# Patient Record
Sex: MALE | Race: WHITE | Employment: OTHER | URBAN - METROPOLITAN AREA
[De-identification: names, ages, dates, MRNs, and addresses within clinical notes are randomized per-mention and may not be internally consistent; named-entity substitution may affect disease eponyms.]

---

## 2017-01-25 ENCOUNTER — TELEPHONE (OUTPATIENT)
Dept: PULMONOLOGY | Age: 77
End: 2017-01-25

## 2017-04-10 ENCOUNTER — TELEPHONE (OUTPATIENT)
Dept: FAMILY MEDICINE CLINIC | Age: 77
End: 2017-04-10

## 2017-04-10 ENCOUNTER — HOSPITAL ENCOUNTER (OUTPATIENT)
Dept: ULTRASOUND IMAGING | Age: 77
Discharge: HOME | End: 2017-04-10
Attending: NURSE PRACTITIONER | Admitting: NURSE PRACTITIONER

## 2017-04-10 DIAGNOSIS — M79.606 PAIN OF LOWER EXTREMITY, UNSPECIFIED LATERALITY: ICD-10-CM

## 2017-04-10 DIAGNOSIS — M79.604 PAIN OF RIGHT LEG: ICD-10-CM

## 2017-04-10 DIAGNOSIS — M79.604 RIGHT LEG PAIN: ICD-10-CM

## 2017-04-10 RX ORDER — TRAMADOL HYDROCHLORIDE 50 MG/1
50 TABLET ORAL EVERY 6 HOURS PRN
Qty: 30 TABLET | Refills: 0 | Status: SHIPPED | OUTPATIENT
Start: 2017-04-10 | End: 2017-04-12

## 2017-04-12 ENCOUNTER — OFFICE VISIT (OUTPATIENT)
Dept: FAMILY MEDICINE CLINIC | Age: 77
End: 2017-04-12

## 2017-04-12 ENCOUNTER — TELEPHONE (OUTPATIENT)
Dept: FAMILY MEDICINE CLINIC | Age: 77
End: 2017-04-12

## 2017-04-12 VITALS
DIASTOLIC BLOOD PRESSURE: 86 MMHG | TEMPERATURE: 97.6 F | HEIGHT: 70 IN | SYSTOLIC BLOOD PRESSURE: 148 MMHG | WEIGHT: 181 LBS | BODY MASS INDEX: 25.91 KG/M2 | HEART RATE: 88 BPM | OXYGEN SATURATION: 97 %

## 2017-04-12 DIAGNOSIS — D75.1 POLYCYTHEMIA: ICD-10-CM

## 2017-04-12 DIAGNOSIS — N28.9 RENAL INSUFFICIENCY: ICD-10-CM

## 2017-04-12 DIAGNOSIS — I82.409 RECURRENT DEEP VEIN THROMBOSIS (DVT) (HCC): ICD-10-CM

## 2017-04-12 DIAGNOSIS — I82.401 ACUTE DEEP VEIN THROMBOSIS (DVT) OF RIGHT LOWER EXTREMITY, UNSPECIFIED VEIN (HCC): ICD-10-CM

## 2017-04-12 PROCEDURE — G8420 CALC BMI NORM PARAMETERS: HCPCS | Performed by: NURSE PRACTITIONER

## 2017-04-12 PROCEDURE — 1123F ACP DISCUSS/DSCN MKR DOCD: CPT | Performed by: NURSE PRACTITIONER

## 2017-04-12 PROCEDURE — 4040F PNEUMOC VAC/ADMIN/RCVD: CPT | Performed by: NURSE PRACTITIONER

## 2017-04-12 PROCEDURE — 99214 OFFICE O/P EST MOD 30 MIN: CPT | Performed by: NURSE PRACTITIONER

## 2017-04-12 PROCEDURE — 1036F TOBACCO NON-USER: CPT | Performed by: NURSE PRACTITIONER

## 2017-04-12 PROCEDURE — G8427 DOCREV CUR MEDS BY ELIG CLIN: HCPCS | Performed by: NURSE PRACTITIONER

## 2017-04-13 ASSESSMENT — ENCOUNTER SYMPTOMS
BLOOD IN STOOL: 0
ABDOMINAL PAIN: 0
COLOR CHANGE: 1
SHORTNESS OF BREATH: 1

## 2017-05-01 ENCOUNTER — OFFICE VISIT (OUTPATIENT)
Dept: FAMILY MEDICINE CLINIC | Age: 77
End: 2017-05-01

## 2017-05-01 VITALS
RESPIRATION RATE: 14 BRPM | HEIGHT: 70 IN | OXYGEN SATURATION: 96 % | BODY MASS INDEX: 26 KG/M2 | DIASTOLIC BLOOD PRESSURE: 100 MMHG | WEIGHT: 181.6 LBS | HEART RATE: 63 BPM | SYSTOLIC BLOOD PRESSURE: 154 MMHG

## 2017-05-01 DIAGNOSIS — I82.401 ACUTE DEEP VEIN THROMBOSIS (DVT) OF RIGHT LOWER EXTREMITY, UNSPECIFIED VEIN (HCC): ICD-10-CM

## 2017-05-01 DIAGNOSIS — D45 POLYCYTHEMIA RUBRA VERA (HCC): ICD-10-CM

## 2017-05-01 DIAGNOSIS — M79.604 PAIN IN RIGHT LEG: Primary | ICD-10-CM

## 2017-05-01 DIAGNOSIS — I10 ESSENTIAL HYPERTENSION: ICD-10-CM

## 2017-05-01 DIAGNOSIS — J44.9 CHRONIC OBSTRUCTIVE PULMONARY DISEASE, UNSPECIFIED COPD TYPE (HCC): ICD-10-CM

## 2017-05-01 DIAGNOSIS — I50.9 CONGESTIVE HEART FAILURE, UNSPECIFIED: ICD-10-CM

## 2017-05-01 PROCEDURE — G8420 CALC BMI NORM PARAMETERS: HCPCS | Performed by: NURSE PRACTITIONER

## 2017-05-01 PROCEDURE — 1036F TOBACCO NON-USER: CPT | Performed by: NURSE PRACTITIONER

## 2017-05-01 PROCEDURE — 1123F ACP DISCUSS/DSCN MKR DOCD: CPT | Performed by: NURSE PRACTITIONER

## 2017-05-01 PROCEDURE — G8926 SPIRO NO PERF OR DOC: HCPCS | Performed by: NURSE PRACTITIONER

## 2017-05-01 PROCEDURE — 4040F PNEUMOC VAC/ADMIN/RCVD: CPT | Performed by: NURSE PRACTITIONER

## 2017-05-01 PROCEDURE — G8427 DOCREV CUR MEDS BY ELIG CLIN: HCPCS | Performed by: NURSE PRACTITIONER

## 2017-05-01 PROCEDURE — 99213 OFFICE O/P EST LOW 20 MIN: CPT | Performed by: NURSE PRACTITIONER

## 2017-05-01 PROCEDURE — 3023F SPIROM DOC REV: CPT | Performed by: NURSE PRACTITIONER

## 2017-05-01 RX ORDER — TRAMADOL HYDROCHLORIDE 50 MG/1
50 TABLET ORAL EVERY 6 HOURS PRN
Qty: 30 TABLET | Refills: 0 | Status: SHIPPED | OUTPATIENT
Start: 2017-05-01 | End: 2017-05-08

## 2017-05-07 ASSESSMENT — ENCOUNTER SYMPTOMS
SHORTNESS OF BREATH: 0
CHEST TIGHTNESS: 0
COUGH: 0
COLOR CHANGE: 0
BACK PAIN: 0

## 2017-05-18 PROBLEM — D45 POLYCYTHEMIA VERA (HCC): Status: ACTIVE | Noted: 2017-05-18

## 2017-06-18 PROBLEM — K62.5 BRIGHT RED RECTAL BLEEDING: Status: ACTIVE | Noted: 2017-06-18

## 2017-06-21 ENCOUNTER — CARE COORDINATION (OUTPATIENT)
Dept: CASE MANAGEMENT | Age: 77
End: 2017-06-21

## 2017-06-21 ENCOUNTER — TELEPHONE (OUTPATIENT)
Dept: PHARMACY | Facility: CLINIC | Age: 77
End: 2017-06-21

## 2017-06-22 RX ORDER — CARVEDILOL 12.5 MG/1
12.5 TABLET ORAL 2 TIMES DAILY WITH MEALS
COMMUNITY
End: 2020-09-14 | Stop reason: SDUPTHER

## 2017-06-27 ENCOUNTER — CARE COORDINATION (OUTPATIENT)
Dept: CASE MANAGEMENT | Age: 77
End: 2017-06-27

## 2017-06-29 ENCOUNTER — TELEPHONE (OUTPATIENT)
Dept: FAMILY MEDICINE CLINIC | Age: 77
End: 2017-06-29

## 2017-08-10 ENCOUNTER — TELEPHONE (OUTPATIENT)
Dept: FAMILY MEDICINE CLINIC | Age: 77
End: 2017-08-10

## 2017-09-14 DIAGNOSIS — I10 ESSENTIAL HYPERTENSION: ICD-10-CM

## 2017-09-14 DIAGNOSIS — R60.1 GENERALIZED EDEMA: ICD-10-CM

## 2017-09-15 DIAGNOSIS — I50.22 CHRONIC SYSTOLIC CHF (CONGESTIVE HEART FAILURE) (HCC): ICD-10-CM

## 2017-09-15 DIAGNOSIS — R60.1 GENERALIZED EDEMA: Primary | ICD-10-CM

## 2017-09-15 RX ORDER — FUROSEMIDE 40 MG/1
40 TABLET ORAL DAILY
Qty: 30 TABLET | Refills: 2 | Status: SHIPPED | OUTPATIENT
Start: 2017-09-15 | End: 2020-09-14 | Stop reason: SDUPTHER

## 2017-09-15 RX ORDER — FUROSEMIDE 20 MG/1
TABLET ORAL
Qty: 30 TABLET | Refills: 5 | OUTPATIENT
Start: 2017-09-15

## 2018-05-21 PROBLEM — D45 POLYCYTHEMIA VERA (HCC): Status: RESOLVED | Noted: 2017-05-18 | Resolved: 2018-05-21

## 2018-05-21 PROBLEM — N18.30 CKD (CHRONIC KIDNEY DISEASE) STAGE 3, GFR 30-59 ML/MIN (HCC): Chronic | Status: ACTIVE | Noted: 2018-05-21

## 2018-05-21 PROBLEM — R65.10 SIRS (SYSTEMIC INFLAMMATORY RESPONSE SYNDROME) (HCC): Status: ACTIVE | Noted: 2018-05-21

## 2018-05-21 PROBLEM — K57.90 DIVERTICULOSIS: Chronic | Status: ACTIVE | Noted: 2018-05-21

## 2018-05-21 PROBLEM — Z87.891 FORMER SMOKER: Chronic | Status: ACTIVE | Noted: 2018-05-21

## 2018-05-21 PROBLEM — M79.606 LEG PAIN: Status: RESOLVED | Noted: 2017-04-10 | Resolved: 2018-05-21

## 2018-05-21 PROBLEM — I16.0 HYPERTENSIVE URGENCY: Status: ACTIVE | Noted: 2018-05-21

## 2018-05-21 PROBLEM — K92.1 HEMATOCHEZIA: Status: ACTIVE | Noted: 2017-06-18

## 2018-10-03 ENCOUNTER — TELEPHONE (OUTPATIENT)
Dept: PULMONOLOGY | Age: 78
End: 2018-10-03

## 2018-10-03 DIAGNOSIS — R06.02 SOB (SHORTNESS OF BREATH): Primary | ICD-10-CM

## 2018-11-06 ENCOUNTER — OFFICE VISIT (OUTPATIENT)
Dept: PULMONOLOGY | Age: 78
End: 2018-11-06
Payer: MEDICARE

## 2018-11-06 VITALS
SYSTOLIC BLOOD PRESSURE: 148 MMHG | RESPIRATION RATE: 22 BRPM | DIASTOLIC BLOOD PRESSURE: 72 MMHG | HEART RATE: 73 BPM | BODY MASS INDEX: 27.11 KG/M2 | OXYGEN SATURATION: 95 % | WEIGHT: 183 LBS | HEIGHT: 69 IN | TEMPERATURE: 98.3 F

## 2018-11-06 DIAGNOSIS — R06.02 SOB (SHORTNESS OF BREATH): ICD-10-CM

## 2018-11-06 DIAGNOSIS — Z98.890 S/P MITRAL VALVE REPAIR: ICD-10-CM

## 2018-11-06 DIAGNOSIS — J44.9 CHRONIC OBSTRUCTIVE PULMONARY DISEASE, UNSPECIFIED COPD TYPE (HCC): Primary | Chronic | ICD-10-CM

## 2018-11-06 PROCEDURE — 99214 OFFICE O/P EST MOD 30 MIN: CPT | Performed by: INTERNAL MEDICINE

## 2018-11-06 PROCEDURE — 1101F PT FALLS ASSESS-DOCD LE1/YR: CPT | Performed by: INTERNAL MEDICINE

## 2018-11-06 PROCEDURE — 4040F PNEUMOC VAC/ADMIN/RCVD: CPT | Performed by: INTERNAL MEDICINE

## 2018-11-06 PROCEDURE — 1123F ACP DISCUSS/DSCN MKR DOCD: CPT | Performed by: INTERNAL MEDICINE

## 2018-11-06 PROCEDURE — G8427 DOCREV CUR MEDS BY ELIG CLIN: HCPCS | Performed by: INTERNAL MEDICINE

## 2018-11-06 PROCEDURE — 1036F TOBACCO NON-USER: CPT | Performed by: INTERNAL MEDICINE

## 2018-11-06 PROCEDURE — 3023F SPIROM DOC REV: CPT | Performed by: INTERNAL MEDICINE

## 2018-11-06 PROCEDURE — G8484 FLU IMMUNIZE NO ADMIN: HCPCS | Performed by: INTERNAL MEDICINE

## 2018-11-06 PROCEDURE — G8926 SPIRO NO PERF OR DOC: HCPCS | Performed by: INTERNAL MEDICINE

## 2018-11-06 PROCEDURE — G8419 CALC BMI OUT NRM PARAM NOF/U: HCPCS | Performed by: INTERNAL MEDICINE

## 2018-11-06 RX ORDER — ALBUTEROL SULFATE 90 UG/1
2 AEROSOL, METERED RESPIRATORY (INHALATION) EVERY 6 HOURS PRN
Qty: 1 INHALER | Refills: 5 | Status: SHIPPED | OUTPATIENT
Start: 2018-11-06

## 2018-11-06 RX ORDER — LEVALBUTEROL INHALATION SOLUTION 0.63 MG/3ML
0.63 SOLUTION RESPIRATORY (INHALATION) EVERY 6 HOURS PRN
Qty: 125 ML | Refills: 5 | Status: SHIPPED | OUTPATIENT
Start: 2018-11-06

## 2018-11-06 NOTE — PROGRESS NOTES
Reviewed any pertinent new labs that are available. PFTs 6/26/16  FVC  (51%) FEV1 1.15 (38%) FEV1/FVC ratio 0.53  TLC  (44%)  RV  (%)   DLCO (62%) Bronchodilator response: ++  FVC  (%) FEV1  (%) FEV1/FVC ratio    TLC  (%)  RV  (%)   DLCO (%) Bronchodilator response    IMAGING:  I personally reviewed and interpreted the following today in the office:   CXR:      Chest CT:    Assessment:   · SMART: multifactorial most likely including COPD and systolic CHF and possibly  · Severe Restrictive lung disease on prior PFTs - will need to r/o Diaphragm dysfunction from his prior open heart surgery if persists on repeat testing  · Chronic Systolic CHF  · MVR s/p annuloplasty  · PCV on Hydrea and phlebotomy in the past - no follow up recently for this  · Afib  - stopped Eliquis due to a GI bleed  · H/o RLE DVT     Plan:   · PFTs/6MWT  · Rx for proventil. Start using a spacer.    · Will schedule a Chest CT and SNIFF test if his restrictive lung disease is redemonstrated  · Add Incruse  · Start nebulizer with xoponex meds  · On lasix per Dr Manisha Ocampo  Will discuss Pulmonary rehab next visit

## 2018-11-28 ENCOUNTER — TELEPHONE (OUTPATIENT)
Dept: PULMONOLOGY | Age: 78
End: 2018-11-28

## 2020-09-10 ENCOUNTER — TELEPHONE (OUTPATIENT)
Dept: FAMILY MEDICINE CLINIC | Age: 80
End: 2020-09-10

## 2020-09-10 ENCOUNTER — NURSE TRIAGE (OUTPATIENT)
Dept: OTHER | Facility: CLINIC | Age: 80
End: 2020-09-10

## 2020-09-10 NOTE — TELEPHONE ENCOUNTER
Pt. Daughter to call and set up an appointment for patient to be seen for SOB, Whistling with breath intake, ? Extra fluid on heart/lung. States he has not been taking any of his medications, because he was saving them, and now is struggling with extra fluid and SOB. Since he had not been seen x 3 years I explained to daughter that he would have to establish as a NP and I could offer him an appt. For 9-. Pt. Is refusing to be seen at an ER or [address] [place] [address]. After daughter explaining to me his O2 sat was 80 I relayed patient message to [facility] for review.

## 2020-09-10 NOTE — TELEPHONE ENCOUNTER
Reason for Disposition   MODERATE difficulty breathing (e.g., speaks in phrases, SOB even at rest, pulse 100-120) of new onset or worse than normal    Answer Assessment - Initial Assessment Questions  1. RESPIRATORY STATUS: \"Describe your breathing? \" (e.g., wheezing, shortness of breath, unable to speak, severe coughing)       Shortness of breath and wheezing  2. ONSET: \"When did this breathing problem begin? \"       Has chronic shortness of breath due to COPD and CHF, caller states that his breathing has gotten worse, started yesterday  3. PATTERN \"Does the difficult breathing come and go, or has it been constant since it started? \"       constant  4. SEVERITY: \"How bad is your breathing? \" (e.g., mild, moderate, severe)     - MILD: No SOB at rest, mild SOB with walking, speaks normally in sentences, can lay down, no retractions, pulse < 100.     - MODERATE: SOB at rest, SOB with minimal exertion and prefers to sit, cannot lie down flat, speaks in phrases, mild retractions, audible wheezing, pulse 100-120.     - SEVERE: Very SOB at rest, speaks in single words, struggling to breathe, sitting hunched forward, retractions, pulse > 120       Moderate, pulse oximeter 88%  5. RECURRENT SYMPTOM: \"Have you had difficulty breathing before? \" If so, ask: \"When was the last time? \" and \"What happened that time? \"       yes  6. CARDIAC HISTORY: \"Do you have any history of heart disease? \" (e.g., heart attack, angina, bypass surgery, angioplasty)       CHF  7. LUNG HISTORY: \"Do you have any history of lung disease? \"  (e.g., pulmonary embolus, asthma, emphysema)      COPD  8. CAUSE: \"What do you think is causing the breathing problem? \"       Not taking medication on a regular basis  9. OTHER SYMPTOMS: \"Do you have any other symptoms? (e.g., dizziness, runny nose, cough, chest pain, fever)      Low back pain  10. PREGNANCY: \"Is there any chance you are pregnant? \" \"When was your last menstrual period? \"        n/a  11.  TRAVEL: \"Have you traveled out of the country in the last month? \" (e.g., travel history, exposures)        no    Protocols used: BREATHING DIFFICULTY-ADULT-[place]    POD 1 [place]  shortness of breath  Low back pain  Daughter called, patient is present, states that he is out of his medications and states that the patient has not been taking his medication daily. Patient refuses recommendation, only wants to be seen by PCP. Caller triage, care advice provided, caller verbalized understanding, transferred to [facility] for scheduling. Please do not reply to the triage nurse through this encounter. Any subsequent communication should be directly with the patient.

## 2020-09-14 ENCOUNTER — TELEPHONE (OUTPATIENT)
Dept: FAMILY MEDICINE CLINIC | Age: 80
End: 2020-09-14

## 2020-09-14 ENCOUNTER — OFFICE VISIT (OUTPATIENT)
Dept: FAMILY MEDICINE CLINIC | Age: 80
End: 2020-09-14
Payer: MEDICARE

## 2020-09-14 VITALS
BODY MASS INDEX: 23.56 KG/M2 | TEMPERATURE: 98.2 F | HEART RATE: 64 BPM | OXYGEN SATURATION: 97 % | DIASTOLIC BLOOD PRESSURE: 94 MMHG | SYSTOLIC BLOOD PRESSURE: 172 MMHG | WEIGHT: 164.6 LBS | RESPIRATION RATE: 20 BRPM | HEIGHT: 70 IN

## 2020-09-14 DIAGNOSIS — I50.22 CHRONIC SYSTOLIC CHF (CONGESTIVE HEART FAILURE) (HCC): Chronic | ICD-10-CM

## 2020-09-14 DIAGNOSIS — D45 POLYCYTHEMIA RUBRA VERA (HCC): Chronic | ICD-10-CM

## 2020-09-14 DIAGNOSIS — N18.30 CKD (CHRONIC KIDNEY DISEASE) STAGE 3, GFR 30-59 ML/MIN (HCC): Chronic | ICD-10-CM

## 2020-09-14 PROBLEM — R65.10 SIRS (SYSTEMIC INFLAMMATORY RESPONSE SYNDROME) (HCC): Status: RESOLVED | Noted: 2018-05-21 | Resolved: 2020-09-14

## 2020-09-14 LAB
A/G RATIO: 1.4 (ref 1.1–2.2)
ALBUMIN SERPL-MCNC: 3.9 G/DL (ref 3.4–5)
ALP BLD-CCNC: 123 U/L (ref 40–129)
ALT SERPL-CCNC: 7 U/L (ref 10–40)
ANION GAP SERPL CALCULATED.3IONS-SCNC: 21 MMOL/L (ref 3–16)
AST SERPL-CCNC: 8 U/L (ref 15–37)
BASOPHILS ABSOLUTE: 0.2 K/UL (ref 0–0.2)
BASOPHILS RELATIVE PERCENT: 1.6 %
BILIRUB SERPL-MCNC: 0.6 MG/DL (ref 0–1)
BUN BLDV-MCNC: 68 MG/DL (ref 7–20)
CALCIUM SERPL-MCNC: 9 MG/DL (ref 8.3–10.6)
CHLORIDE BLD-SCNC: 98 MMOL/L (ref 99–110)
CO2: 19 MMOL/L (ref 21–32)
CREAT SERPL-MCNC: 4.1 MG/DL (ref 0.8–1.3)
EOSINOPHILS ABSOLUTE: 0.1 K/UL (ref 0–0.6)
EOSINOPHILS RELATIVE PERCENT: 1.1 %
GFR AFRICAN AMERICAN: 17
GFR NON-AFRICAN AMERICAN: 14
GLOBULIN: 2.8 G/DL
GLUCOSE BLD-MCNC: 101 MG/DL (ref 70–99)
HCT VFR BLD CALC: 63.9 % (ref 40.5–52.5)
HEMOGLOBIN: 19.7 G/DL (ref 13.5–17.5)
LYMPHOCYTES ABSOLUTE: 0.7 K/UL (ref 1–5.1)
LYMPHOCYTES RELATIVE PERCENT: 5.6 %
MCH RBC QN AUTO: 26.3 PG (ref 26–34)
MCHC RBC AUTO-ENTMCNC: 30.9 G/DL (ref 31–36)
MCV RBC AUTO: 85.1 FL (ref 80–100)
MONOCYTES ABSOLUTE: 0.9 K/UL (ref 0–1.3)
MONOCYTES RELATIVE PERCENT: 7.2 %
NEUTROPHILS ABSOLUTE: 11.1 K/UL (ref 1.7–7.7)
NEUTROPHILS RELATIVE PERCENT: 84.5 %
PDW BLD-RTO: 19.5 % (ref 12.4–15.4)
PLATELET # BLD: 280 K/UL (ref 135–450)
PMV BLD AUTO: 8.4 FL (ref 5–10.5)
POTASSIUM SERPL-SCNC: 4 MMOL/L (ref 3.5–5.1)
RBC # BLD: 7.51 M/UL (ref 4.2–5.9)
SODIUM BLD-SCNC: 138 MMOL/L (ref 136–145)
TOTAL PROTEIN: 6.7 G/DL (ref 6.4–8.2)
WBC # BLD: 13.2 K/UL (ref 4–11)

## 2020-09-14 PROCEDURE — 99214 OFFICE O/P EST MOD 30 MIN: CPT | Performed by: STUDENT IN AN ORGANIZED HEALTH CARE EDUCATION/TRAINING PROGRAM

## 2020-09-14 RX ORDER — CARVEDILOL 12.5 MG/1
6.25 TABLET ORAL 2 TIMES DAILY WITH MEALS
Qty: 60 TABLET | Refills: 3 | Status: ON HOLD | OUTPATIENT
Start: 2020-09-14 | End: 2020-09-23 | Stop reason: HOSPADM

## 2020-09-14 RX ORDER — FUROSEMIDE 40 MG/1
40 TABLET ORAL DAILY
Qty: 30 TABLET | Refills: 2 | Status: ON HOLD | OUTPATIENT
Start: 2020-09-14 | End: 2020-09-23 | Stop reason: HOSPADM

## 2020-09-14 RX ORDER — LISINOPRIL 5 MG/1
5 TABLET ORAL DAILY
Qty: 30 TABLET | Refills: 2 | Status: ON HOLD | OUTPATIENT
Start: 2020-09-14 | End: 2020-09-23 | Stop reason: HOSPADM

## 2020-09-14 ASSESSMENT — ENCOUNTER SYMPTOMS
DIARRHEA: 0
WHEEZING: 0
CONSTIPATION: 0
CHEST TIGHTNESS: 1
SHORTNESS OF BREATH: 1

## 2020-09-14 ASSESSMENT — PATIENT HEALTH QUESTIONNAIRE - PHQ9
1. LITTLE INTEREST OR PLEASURE IN DOING THINGS: 1
SUM OF ALL RESPONSES TO PHQ QUESTIONS 1-9: 2
SUM OF ALL RESPONSES TO PHQ QUESTIONS 1-9: 2
SUM OF ALL RESPONSES TO PHQ9 QUESTIONS 1 & 2: 2
2. FEELING DOWN, DEPRESSED OR HOPELESS: 1

## 2020-09-14 NOTE — PROGRESS NOTES
2020    Precious Santillan (:  1940) is a [de-identified] y.o. male, here for evaluation of the following medical concerns:    HPI    COPD:  Patient daughter report that initial diagnosis was 2 to 3 years ago. Briefly followed with pulmonologist after PFTs, was prescribed umeclidinium inhaler and took for 1 month however stopped due to cost.  Reports that he had mild improvement while taking this medication. Reports that he is now using albuterol 2-3 times a day and has improvement when using. A-fib  Reports that he was initially diagnosed with this 5 or 6 years ago. Followed with a cardiologist briefly however has not followed up in years. Daughter believes that a ablation was attempted and successful for short period of time during which patient felt better however return to chronic A. fib afterwards. He was also placed on Eliquis but this was stopped due to GI bleed. Patient does not wish to restart medication at this time. Reports she is drinking 1 cup of coffee a day otherwise avoiding caffeine    HF  Initially diagnosed in . Had mitral valve repair done at this time by Dr. Jaret Morris.  Has not followed up with him in 1-1/2 to 2 years. Reports that due to this he is run low on refills of carvedilol and Lasix has been taking half of his prescribed dose for the last 6 months. During this time he is noticed that he has had increased shortness of breath worsening dyspnea on exertion and chest pressure when attempting to lay flat. Did increase his dose of Lasix over the last few days and noticed an improvement. Weight loss  Reports a 20 pound weight loss in the last 3-6 months. Daughter and patient attribute this to decreased appetite. Patient reports that he eats very little and mostly eats either cereal or oranges. Denies protein intake. Hypertension  Reports history of hypertension that is been worse recently with decreased carvedilol and Lasix dose.  Daughter does report that she checked her blood pressure after taking dose of Lasix and that it improved to 140/65. Denies headache, chest pain, visual changes. Health Maintenance  Lives with wife, children come to check on them frequently  Does not wish to have invasive testing done    Review of Systems   Constitutional: Positive for appetite change and unexpected weight change. Eyes: Negative for visual disturbance. Respiratory: Positive for chest tightness and shortness of breath. Negative for wheezing. Cardiovascular: Positive for palpitations (chronic). Negative for chest pain. Gastrointestinal: Negative for constipation and diarrhea. Genitourinary: Positive for difficulty urinating (intermittently using flomax, has not used in some time). Skin: Negative for rash. Neurological: Negative for dizziness, light-headedness and headaches. Psychiatric/Behavioral: Negative for agitation. Prior to Visit Medications    Medication Sig Taking?  Authorizing Provider   lisinopril (PRINIVIL;ZESTRIL) 5 MG tablet Take 1 tablet by mouth daily Yes Boris Altman DO   furosemide (LASIX) 40 MG tablet Take 1 tablet by mouth daily Yes Boris Altman DO   carvedilol (COREG) 12.5 MG tablet Take 0.5 tablets by mouth 2 times daily (with meals) Yes Boris Altman DO   Umeclidinium Bromide 62.5 MCG/INH AEPB Inhale 62.5 mcg into the lungs daily Yes Boris Altman DO   fluticasone-salmeterol (ADVAIR DISKUS) 250-50 MCG/DOSE AEPB Inhale 1 puff into the lungs 2 times daily Yes Boris Altman DO   aspirin 81 MG tablet Take 81 mg by mouth daily Yes Historical Provider, MD   albuterol sulfate HFA (PROVENTIL HFA) 108 (90 Base) MCG/ACT inhaler Inhale 2 puffs into the lungs every 6 hours as needed for Wheezing Yes Rashid Santos MD   levalbuterol Pageedison Cavanaugh) 0.63 MG/3ML nebulization Take 3 mLs by nebulization every 6 hours as needed for Wheezing  Patient not taking: Reported on 9/14/2020  Rashid Santos MD   Spacer/Aero-Holding [place] (ESANG SPACER) MARIFER 1 Device by Does not apply route daily as needed (prn with Albuterol)  Patient not taking: Reported on 2020  Quita Vila MD   isosorbide mononitrate (IMDUR) 30 MG CR tablet Take 30 mg by mouth daily  Historical Provider, MD        Allergies   Allergen Reactions    Pcn [Penicillins] Rash     Skin peeling Charmel Locus Librado Clement reaction       Past Medical History:   Diagnosis Date    [facility] [facility]     CHF (congestive heart failure) (HCC)     COPD (chronic obstructive pulmonary disease) (HCC)     Elevated PSA     Enlarged prostate     Hypertension     Hyperthyroidism     resolved    Right leg DVT ([facility] [address] 2015       Past Surgical History:   Procedure Laterality Date    CARDIAC SURGERY      mitral valve repair    COLONOSCOPY  2017    COLONOSCOPY  2018    polyp removal, sigmoid diverticulosis, rectal bleeding    CYSTOSCOPY  1/20/15    Clot Evacuation    CYSTOSCOPY  2/18/15    HERNIA REPAIR      TURP  2/18/15    CLOT EVACUATION, TRANSURETHRAL RESECTION OF THE PROSTATE       Social History     Socioeconomic History    Marital status:      Spouse name: Not on file    Number of children: Not on file    Years of education: Not on file    Highest education level: Not on file   Occupational History    Not on file   Social Needs    Financial resource strain: Not on file    Food insecurity     Worry: Not on file     Inability: Not on file    Transportation needs     Medical: Not on file     Non-medical: Not on file   Tobacco Use    Smoking status: Former Smoker     Years: 30.00     Last attempt to quit: 2002     Years since quittin.7    Smokeless tobacco: Never Used    Tobacco comment: 2 pipes per day   Substance and Sexual Activity    Alcohol use: No    Drug use: No    Sexual activity: Yes     Partners: Female   Lifestyle    Physical activity     Days per week: Not on file     Minutes per session: Not on file    Stress: Not on file Relationships    Social connections     Talks on phone: Not on file     Gets together: Not on file     Attends [employer] service: Not on file     Active member of club or organization: Not on file     Attends meetings of clubs or organizations: Not on file     Relationship status: Not on file    Intimate partner violence     Fear of current or ex partner: Not on file     Emotionally abused: Not on file     Physically abused: Not on file     Forced sexual activity: Not on file   Other Topics Concern    Not on file   Social History Narrative    Not on file        No family history on file. Vitals:    09/14/20 0857   BP: (!) 172/94   Site: Left Upper Arm   Position: Sitting   Cuff Size: Medium Adult   Pulse: 64   Resp: 20   Temp: 98.2 °F (36.8 °C)   TempSrc: Temporal   SpO2: 97%   Weight: 164 lb 9.6 oz (74.7 kg)   Height: 5' 10\" (1.778 m)     Estimated body mass index is 23.62 kg/m² as calculated from the following:    Height as of this encounter: 5' 10\" (1.778 m). Weight as of this encounter: 164 lb 9.6 oz (74.7 kg). Physical Exam  Vitals signs reviewed. Constitutional:       General: He is not in acute distress. Appearance: Normal appearance. He is not ill-appearing. HENT:      Head: Normocephalic and atraumatic. Right Ear: Tympanic membrane normal.      Left Ear: Tympanic membrane normal.   Eyes:      Extraocular Movements: Extraocular movements intact. Conjunctiva/sclera: Conjunctivae normal.   Cardiovascular:      Rate and Rhythm: Normal rate. Rhythm irregular. Pulses: Normal pulses. Heart sounds: Murmur (2/6 mitral) present. Comments: NO JVD or peripheral edema on exam  Pulmonary:      Effort: Pulmonary effort is normal.      Breath sounds: Normal breath sounds. No wheezing or rhonchi. Abdominal:      General: Abdomen is flat. Bowel sounds are normal. There is no distension. Palpations: Abdomen is soft. Tenderness: There is no abdominal tenderness. Musculoskeletal: Normal range of motion. General: No swelling. Right lower leg: No edema. Left lower leg: No edema. Skin:     General: Skin is warm and dry. Capillary Refill: Capillary refill takes less than 2 seconds. Findings: No rash. Neurological:      General: No focal deficit present. Mental Status: He is alert and oriented to person, place, and time. Psychiatric:         Mood and Affect: Mood normal.         Behavior: Behavior normal.         Thought Content: Thought content normal.         Judgment: Judgment normal.         ASSESSMENT/PLAN:  1. Chronic obstructive pulmonary disease, unspecified COPD type ([facility] [address] [facility]  Patient is previously been on Umeclidinium, however stopped due to expense. Has not followed up with pulmonologist in over 2 years. Reports that he is using his albuterol inhaler 2-3 times a day. Reports that this is helping. Due to improvement with rescue inhaler, feel that some portion of shortness of breath probably is related to COPD. Please see addendum  - Umeclidinium Bromide 62.5 MCG/INH AEPB; Inhale 62.5 mcg into the lungs daily  Dispense: 30 each; Refill: 1  - fluticasone-salmeterol (ADVAIR DISKUS) 250-50 MCG/DOSE AEPB; Inhale 1 puff into the lungs 2 times daily  Dispense: 1 each; Refill: 5    2. Chronic systolic CHF (EF 97-17%)  Patient does not wish to have any further evaluation of heart failure at this time. Does not know dry weight. No signs of fluid overload on exam today besides dyspnea. Will restart Lasix and Coreg (at half dose). Will follow-up in 2 weeks to see how current exacerbation is doing.  - Comprehensive Metabolic Panel; Future  - furosemide (LASIX) 40 MG tablet; Take 1 tablet by mouth daily  Dispense: 30 tablet; Refill: 2  - carvedilol (COREG) 12.5 MG tablet; Take 0.5 tablets by mouth 2 times daily (with meals)  Dispense: 60 tablet; Refill: 3    3.  CKD (chronic kidney disease) stage 3, GFR 30-59 ml/min ([facility])  Patient with history of CKD will recheck  - Comprehensive Metabolic Panel; Future    4. Essential hypertension  BP elevated on exam today. Will add lisinopril given heart failure. Daughter reports blood pressure much more well controlled at home, will start low-dose and increase as necessary.  - lisinopril (PRINIVIL;ZESTRIL) 5 MG tablet; Take 1 tablet by mouth daily  Dispense: 30 tablet; Refill: 2    5. Polycythemia rubra vera ([facility] [address]  Recheck CBC.  - CBC Auto Differential; Future    6. Generalized edema  Likely secondary to heart failure  - furosemide (LASIX) 40 MG tablet; Take 1 tablet by mouth daily  Dispense: 30 tablet; Refill: 2    7. Chronic systolic CHF (congestive heart failure) ([facility] [address]  As above. Refuses echo  - Comprehensive Metabolic Panel; Future  - furosemide (LASIX) 40 MG tablet; Take 1 tablet by mouth daily  Dispense: 30 tablet; Refill: 2  - carvedilol (COREG) 12.5 MG tablet; Take 0.5 tablets by mouth 2 times daily (with meals)  Dispense: 60 tablet; Refill: 3    8. Longstanding persistent atrial fibrillation  Currently rate controlled on carvedilol. Prior ablation with improvement for approximately 6 months until recurrent A. Fib. Does not wish for cardiac evaluation at this time. Declines blood thinner    Return in about 2 weeks (around 9/28/2020). An  electronic signature was used to authenticate this note. --Shilpi Rosen, DO on 9/14/2020 at 4:38 PM       Addendum:  Called and discussed options with daughter in law regarding inhalers. Discussed that umeclidinium would be optimal first choice however understand that cost is a factor. If this is too expensive, also sent in prescription for Advair.   Discussed that Advair is more of an asthma medication but can be useful in COPD given its an expensive cost.

## 2020-09-14 NOTE — TELEPHONE ENCOUNTER
Called and discussed inhaler medications with daughter-in-law. Will send and Umeclidinium and Advair. Discussed that Advair was a backup option if cost was prohibitive for umeclidinium. Daughter-in-law expressed understanding.

## 2020-09-15 RX ORDER — FLUTICASONE FUROATE, UMECLIDINIUM BROMIDE AND VILANTEROL TRIFENATATE 100; 62.5; 25 UG/1; UG/1; UG/1
1 POWDER RESPIRATORY (INHALATION) DAILY
Qty: 1 EACH | Refills: 0 | Status: SHIPPED | OUTPATIENT
Start: 2020-09-15

## 2020-09-15 NOTE — TELEPHONE ENCOUNTER
Called patient to discuss recent lab work that showed significant decrease in kidney function as compared to prior labs 2 years ago. Also showed significantly elevated Hgb/HCt consistent with previous polycythemia diagnosis. Patient asked to have his daughter call back and discuss this further with me this afternoon. Will talk to daughter to determine further treatment plan. Also, saw note about trelegy inhaler.  Will send in script for trial.

## 2020-09-15 NOTE — TELEPHONE ENCOUNTER
Bradford Ny, pt's daughter called to let Dr. Meliton Hernandes know the Incruse (Umeclidinium) inhaler that was prescribed 9/14/20  will cost pt around $300 dollars. The pharmacy advised if PCP would be willing to switch medication to Trelegy Ellipta they have a free #30 day coupon offer pt can use. Bradford Ny stated If pt tolerates/ and medication works then they would asked that # 90 supply be sent to mail order. (not to be sent until pt tries other inhaler) are you willing to switch medication?    I did pend medication for you Send to [facility]

## 2020-09-16 ENCOUNTER — TELEPHONE (OUTPATIENT)
Dept: FAMILY MEDICINE CLINIC | Age: 80
End: 2020-09-16

## 2020-09-16 NOTE — TELEPHONE ENCOUNTER
Daughter Janna Gaines called back regarding recent lab work. Discussed with daughter results consistent with polycythemia and recommended further evaluation at [facility] where patient has been seen before. Daughter stated that she would talk with her father and see if he would be amenable to seeing them again. Will call back. Also discussed worsening kidney function with current creatinine 4.1. Reports that patient has been compliant with Lasix dose at home and is feeling better, continuing to make urine. Is agreeable to have additional lab tests drawn to check status of kidney function while taking Lasix. We will have this done on Friday. Discussed risks of current kidney function with Lasix dosing.

## 2020-09-19 ENCOUNTER — TELEPHONE (OUTPATIENT)
Dept: FAMILY MEDICINE CLINIC | Age: 80
End: 2020-09-19

## 2020-09-19 RX ORDER — VALACYCLOVIR HYDROCHLORIDE 1 G/1
1000 TABLET, FILM COATED ORAL 3 TIMES DAILY
Qty: 21 TABLET | Refills: 0 | Status: ON HOLD | OUTPATIENT
Start: 2020-09-19 | End: 2020-09-23 | Stop reason: HOSPADM

## 2020-09-20 NOTE — TELEPHONE ENCOUNTER
On call communication:    Daughter calling in stating patient developed shingles today. Posterior neck on the right side at his hairline with vesicles, some vesicles behind his right ear. No vesicles on his face or near his eye. Had not been feeling well for a few days prior, had a headache and achiness, fatigue. Daughter has worked in primary care and dermatology, has seen shingles before, and states she believes his rash is shingles. Discussed early treatment of shingles with antivirals is best for preventing long term pain. Sent in Valtrex to his pharmacy to be filled tonight if possible, or at least first thing in the morning if they are currently closed. Already has close follow up with Dr. Dinesh Villalta in about 2 weeks, but they are happy to bring him in sooner if needed. I advised to call us back for follow up if he has severe pain, or if any of the vesicles look like they are getting infected. Daughter was agreeable to that plan.

## 2020-09-21 ENCOUNTER — HOSPITAL ENCOUNTER (INPATIENT)
Age: 80
LOS: 2 days | Discharge: HOME | DRG: 683 | End: 2020-09-23
Attending: STUDENT IN AN ORGANIZED HEALTH CARE EDUCATION/TRAINING PROGRAM | Admitting: INTERNAL MEDICINE
Payer: MEDICARE

## 2020-09-21 ENCOUNTER — APPOINTMENT (OUTPATIENT)
Dept: CT IMAGING | Age: 80
DRG: 683 | End: 2020-09-21
Payer: MEDICARE

## 2020-09-21 ENCOUNTER — APPOINTMENT (OUTPATIENT)
Dept: GENERAL RADIOLOGY | Age: 80
DRG: 683 | End: 2020-09-21
Payer: MEDICARE

## 2020-09-21 ENCOUNTER — NURSE TRIAGE (OUTPATIENT)
Dept: OTHER | Facility: CLINIC | Age: 80
End: 2020-09-21

## 2020-09-21 PROBLEM — N17.9 AKI (ACUTE KIDNEY INJURY) (HCC): Status: ACTIVE | Noted: 2020-09-21

## 2020-09-21 LAB
A/G RATIO: 1.1 (ref 1.1–2.2)
ALBUMIN SERPL-MCNC: 3.9 G/DL (ref 3.4–5)
ALP BLD-CCNC: 114 U/L (ref 40–129)
ALT SERPL-CCNC: 10 U/L (ref 10–40)
ANION GAP SERPL CALCULATED.3IONS-SCNC: 15 MMOL/L (ref 3–16)
ANISOCYTOSIS: ABNORMAL
APTT: 48.6 SEC (ref 24.2–36.2)
AST SERPL-CCNC: 14 U/L (ref 15–37)
BACTERIA: ABNORMAL /HPF
BASOPHILS ABSOLUTE: 0 K/UL (ref 0–0.2)
BASOPHILS RELATIVE PERCENT: 0 %
BILIRUB SERPL-MCNC: 0.8 MG/DL (ref 0–1)
BILIRUBIN URINE: NEGATIVE
BLOOD, URINE: ABNORMAL
BUN BLDV-MCNC: 62 MG/DL (ref 7–20)
CALCIUM SERPL-MCNC: 9.2 MG/DL (ref 8.3–10.6)
CHLORIDE BLD-SCNC: 96 MMOL/L (ref 99–110)
CLARITY: ABNORMAL
CO2: 19 MMOL/L (ref 21–32)
COLOR: YELLOW
CREAT SERPL-MCNC: 3.7 MG/DL (ref 0.8–1.3)
EKG ATRIAL RATE: 125 BPM
EKG DIAGNOSIS: NORMAL
EKG Q-T INTERVAL: 392 MS
EKG QRS DURATION: 152 MS
EKG QTC CALCULATION (BAZETT): 508 MS
EKG R AXIS: -69 DEGREES
EKG T AXIS: 49 DEGREES
EKG VENTRICULAR RATE: 101 BPM
EOSINOPHILS ABSOLUTE: 0.2 K/UL (ref 0–0.6)
EOSINOPHILS RELATIVE PERCENT: 1 %
GFR AFRICAN AMERICAN: 19
GFR NON-AFRICAN AMERICAN: 16
GLOBULIN: 3.7 G/DL
GLUCOSE BLD-MCNC: 177 MG/DL (ref 70–99)
GLUCOSE URINE: NEGATIVE MG/DL
HCT VFR BLD CALC: 61.3 % (ref 40.5–52.5)
HEMOGLOBIN: 19.6 G/DL (ref 13.5–17.5)
INR BLD: 1.14 (ref 0.86–1.14)
KETONES, URINE: NEGATIVE MG/DL
LEUKOCYTE ESTERASE, URINE: ABNORMAL
LYMPHOCYTES ABSOLUTE: 0.7 K/UL (ref 1–5.1)
LYMPHOCYTES RELATIVE PERCENT: 4 %
MCH RBC QN AUTO: 26.6 PG (ref 26–34)
MCHC RBC AUTO-ENTMCNC: 31.9 G/DL (ref 31–36)
MCV RBC AUTO: 83.5 FL (ref 80–100)
MICROSCOPIC EXAMINATION: YES
MONOCYTES ABSOLUTE: 0 K/UL (ref 0–1.3)
MONOCYTES RELATIVE PERCENT: 0 %
NEUTROPHILS ABSOLUTE: 15.5 K/UL (ref 1.7–7.7)
NEUTROPHILS RELATIVE PERCENT: 95 %
NITRITE, URINE: NEGATIVE
PDW BLD-RTO: 18.9 % (ref 12.4–15.4)
PH UA: 5.5 (ref 5–8)
PLATELET # BLD: 288 K/UL (ref 135–450)
PLATELET SLIDE REVIEW: ADEQUATE
PMV BLD AUTO: 7.9 FL (ref 5–10.5)
POTASSIUM REFLEX MAGNESIUM: 4.6 MMOL/L (ref 3.5–5.1)
PRO-BNP: 8830 PG/ML (ref 0–449)
PROTEIN UA: 100 MG/DL
PROTHROMBIN TIME: 13.3 SEC (ref 10–13.2)
RBC # BLD: 7.35 M/UL (ref 4.2–5.9)
RBC UA: ABNORMAL /HPF (ref 0–4)
SLIDE REVIEW: ABNORMAL
SODIUM BLD-SCNC: 130 MMOL/L (ref 136–145)
SPECIFIC GRAVITY UA: 1.02 (ref 1–1.03)
TOTAL PROTEIN: 7.6 G/DL (ref 6.4–8.2)
TROPONIN: 0.02 NG/ML
TSH REFLEX: 2.84 UIU/ML (ref 0.27–4.2)
URINE REFLEX TO CULTURE: YES
URINE TYPE: ABNORMAL
UROBILINOGEN, URINE: 0.2 E.U./DL
WBC # BLD: 16.3 K/UL (ref 4–11)
WBC UA: >100 /HPF (ref 0–5)

## 2020-09-21 PROCEDURE — 93005 ELECTROCARDIOGRAM TRACING: CPT | Performed by: PHYSICIAN ASSISTANT

## 2020-09-21 PROCEDURE — 93010 ELECTROCARDIOGRAM REPORT: CPT | Performed by: INTERNAL MEDICINE

## 2020-09-21 PROCEDURE — 71045 X-RAY EXAM CHEST 1 VIEW: CPT

## 2020-09-21 PROCEDURE — 85025 COMPLETE CBC W/AUTO DIFF WBC: CPT

## 2020-09-21 PROCEDURE — 87186 SC STD MICRODIL/AGAR DIL: CPT

## 2020-09-21 PROCEDURE — 96375 TX/PRO/DX INJ NEW DRUG ADDON: CPT

## 2020-09-21 PROCEDURE — 6360000002 HC RX W HCPCS: Performed by: PHYSICIAN ASSISTANT

## 2020-09-21 PROCEDURE — 80053 COMPREHEN METABOLIC PANEL: CPT

## 2020-09-21 PROCEDURE — 2500000003 HC RX 250 WO HCPCS: Performed by: PHYSICIAN ASSISTANT

## 2020-09-21 PROCEDURE — 6370000000 HC RX 637 (ALT 250 FOR IP): Performed by: PHYSICIAN ASSISTANT

## 2020-09-21 PROCEDURE — 2580000003 HC RX 258: Performed by: PHYSICIAN ASSISTANT

## 2020-09-21 PROCEDURE — 99285 EMERGENCY DEPT VISIT HI MDM: CPT

## 2020-09-21 PROCEDURE — 84443 ASSAY THYROID STIM HORMONE: CPT

## 2020-09-21 PROCEDURE — 85730 THROMBOPLASTIN TIME PARTIAL: CPT

## 2020-09-21 PROCEDURE — 83880 ASSAY OF NATRIURETIC PEPTIDE: CPT

## 2020-09-21 PROCEDURE — 84484 ASSAY OF TROPONIN QUANT: CPT

## 2020-09-21 PROCEDURE — 96365 THER/PROPH/DIAG IV INF INIT: CPT

## 2020-09-21 PROCEDURE — 87086 URINE CULTURE/COLONY COUNT: CPT

## 2020-09-21 PROCEDURE — 87077 CULTURE AEROBIC IDENTIFY: CPT

## 2020-09-21 PROCEDURE — 70450 CT HEAD/BRAIN W/O DYE: CPT

## 2020-09-21 PROCEDURE — 85610 PROTHROMBIN TIME: CPT

## 2020-09-21 PROCEDURE — 81001 URINALYSIS AUTO W/SCOPE: CPT

## 2020-09-21 PROCEDURE — 2060000000 HC ICU INTERMEDIATE R&B

## 2020-09-21 RX ORDER — LISINOPRIL 5 MG/1
5 TABLET ORAL ONCE
Status: COMPLETED | OUTPATIENT
Start: 2020-09-21 | End: 2020-09-21

## 2020-09-21 RX ORDER — HYDRALAZINE HYDROCHLORIDE 20 MG/ML
10 INJECTION INTRAMUSCULAR; INTRAVENOUS ONCE
Status: COMPLETED | OUTPATIENT
Start: 2020-09-21 | End: 2020-09-21

## 2020-09-21 RX ORDER — CARVEDILOL 6.25 MG/1
12.5 TABLET ORAL ONCE
Status: DISCONTINUED | OUTPATIENT
Start: 2020-09-21 | End: 2020-09-21

## 2020-09-21 RX ORDER — LORAZEPAM 2 MG/ML
0.5 INJECTION INTRAMUSCULAR ONCE
Status: COMPLETED | OUTPATIENT
Start: 2020-09-21 | End: 2020-09-21

## 2020-09-21 RX ORDER — CARVEDILOL 6.25 MG/1
6.25 TABLET ORAL ONCE
Status: COMPLETED | OUTPATIENT
Start: 2020-09-21 | End: 2020-09-21

## 2020-09-21 RX ADMIN — AZTREONAM 500 MG: 1 INJECTION, POWDER, LYOPHILIZED, FOR SOLUTION INTRAMUSCULAR; INTRAVENOUS at 17:00

## 2020-09-21 RX ADMIN — LISINOPRIL 5 MG: 5 TABLET ORAL at 15:07

## 2020-09-21 RX ADMIN — HYDRALAZINE HYDROCHLORIDE 10 MG: 20 INJECTION INTRAMUSCULAR; INTRAVENOUS at 16:14

## 2020-09-21 RX ADMIN — LORAZEPAM 0.5 MG: 2 INJECTION INTRAMUSCULAR; INTRAVENOUS at 17:12

## 2020-09-21 RX ADMIN — CARVEDILOL 6.25 MG: 6.25 TABLET, FILM COATED ORAL at 15:07

## 2020-09-21 ASSESSMENT — ENCOUNTER SYMPTOMS
VOMITING: 0
SHORTNESS OF BREATH: 1
COUGH: 0
ABDOMINAL PAIN: 0
VISUAL CHANGE: 0

## 2020-09-21 NOTE — ED NOTES
[id number] [facility] message for Hospitalist/ admission     [id number] Hospitalist returned call, speaking with Luis Sow now      Caridad Villatoro RN  09/21/20 [address], RN  09/21/20 [id number]

## 2020-09-21 NOTE — PLAN OF CARE
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

## 2020-09-21 NOTE — TELEPHONE ENCOUNTER
Received call from Lise in [facility]. Julianna Friedman, daughter calling and she is not with patient. .   When he closes his eyes, he sees dancing skeletons, and the windmill in the back yard is getting bigger and closer and the the grass is turing red. He is dizzy and weak, which is not necessarily new. He has been sick with kidney failure and heart failure, then go shingles 9/21/20. Was started on Valtrex 9/20/20 and has taken 3 doses. Discussed that writer needs to speak with somebody that is with patient. I will call patient's wife and speak with her. Called and spoke with Richard. He fell into the chair. The hallucinations occur when he is sleeping or when he closes his eyes,  He sees psychodelic things, colors, faces. When his eyes are open, he sees clearing. He says he has double vision. Spoke with patient. He is lucid at this time  Insists the visual issues are from the Valtrex. He wants another med for the Shingles. Discussed that kidney function could play a part in the visual issues and that the blurred vision and double vision that are present could be an indicator. Wife is  Pretty sure he will not go. Wife does not seem to be informed of all that is going on with patient, but the daughters are doing most of that, and that she, herself needs help taking care of him. He is a bit stubborn. He states he is not going to take Valtrex anymore. Instructed to hold next dose until he speaks with a provider. He states every one keeps telling him to drink more water to flush the Valtrex out of his system. Writer advised against that due to kidney and heart issues, but to defer that decision to the provider he sees in ER. Called and spoke with Sanjana Davila NP on call who recommend he proceed to ER. Spoke with wife then with patient and he agrees to proceed to ER for evaluation. Wife states she will call John, since he seems to listen to her. Please do not reply to the triage nurse through this encounter. Any subsequent communication should be directly with the patient.      Reason for Disposition   Blurred vision or visual changes and present now and sudden onset or new (e.g., minutes, hours, days) (Exception: seeing floaters / black specks OR previously diagnosed migraine headaches with same symptoms)    Protocols used: VISION LOSS OR CHANGE-ADULT-[place]

## 2020-09-21 NOTE — ED PROVIDER NOTES
I independently examined and evaluated [employer]. All diagnostic, treatment, and disposition decisions were made by myself in conjunction with the advanced practice provider. For all further details of the patient's emergency department visit, please see the advanced practice provider's documentation. Primary Care Physician: Jairo Adam DO    History: This is a [de-identified] y.o. male with a history of CHF, COPD, hypertension who presents to the Emergency Department with complaint of chronic dizziness, no acute change today, dizziness Chief complaint however is been ongoing for several months and noted no change, no headaches, or focal deficit. Patient presenting today due to concerns for visual hallucinations, states that when he closes his eyes he sees things, (pictures on the wall, spiders on the wall \". Symptoms are ongoing for last several days, no new medication change from chronic medications however just started on Valtrex prior to onset of symptoms for concern for possible shingles to scalp. Denies eye pain or irritation. On my evaluation patient denying chest pain, shortness of breath, abdominal pain nausea, vomiting, focal neuro deficit. Patient with a history of known atrial fibrillation, and A. fib on arrival relatively rate controlled, did not take Coreg this morning. Patient also has a history of hypertension has been off medications for some time just restarted blood pressure is elevated denying headaches or focal deficit. Physical:     height is 5' 8\" (1.727 m) and weight is 165 lb (74.8 kg). His temporal temperature is 98 °F (36.7 °C). His blood pressure is 231/208 (abnormal) and his pulse is 58.  His respiration is 18 and oxygen saturation is 95%.    [de-identified] y.o. male   Alert and oriented  Heart irregular irregular  Lungs clear to auscultation  Abdomen soft nontender  Neuro: No vision loss, NIH 0 cranial nerves grossly intact, no focal sensory or strength deficit upper lower extremity    Impression: Medication reaction, metabolic derangement, hypertensive urgency    Plan: CT head, metabolic work-up, urine, BP control    EKG Interpretation    The Ekg interpreted by me shows  atrial fibrillation with a rate of 101  Axis is   Normal  QTc is  508  RBBB, bifasicular block     ST Segments: nonspecific changes    A. fib RVR with new right bundle branch block, left ventricular fascicular block compared to EKG on June 18, 2017        CRITICAL CARE: There was a high probability of clinically significant/life threatening deterioration in this patient's condition which required my urgent intervention. Total critical care time was  minutes. This excludes any time for separately reportable procedures. Christiano Jason DO  Emergency Physician        Comment: Please note this report has been produced using speech recognition software and may contain errors related to that system including errors in grammar, punctuation, and spelling, as well as words and phrases that may be inappropriate. If there are any questions or concerns please feel free to contact the dictating provider for clarification.           Christiano Jason DO  09/21/20 6616

## 2020-09-21 NOTE — PROGRESS NOTES
Attempted sommer cath, was not able to insert due to prostate. External cath placed with no difficulty. Pt still hallucinating, very unsteady while standing.  Notified clinical of concerns and requested bed close to nurses station

## 2020-09-21 NOTE — ED PROVIDER NOTES
clearing up now. Denies any pain in his ear. Difficulty hearing. Denies any difficulty with his vision. No loss of vision. No blurry vision. No double vision. Denies any headache. No numbness or focal weakness. Complains of generalized weakness and fatigue and also feeling short of breath. He has chronic shortness of breath has COPD and history of CHF states the shortness of breath has worsened over the past 1 month. Blood pressure here 231/108 states he did not take any of his medications today. Denies any chest pain. He has chronic atrial fibrillation. He takes 81 mg aspirin a day but did not take this today. Not on anticoagulants. States he is not having difficulty urinating for the past few months family states he has not been eating or drinking very much states that is because he does not want to urinate so much. No abdominal pain. No vomiting. No fever. Alert and oriented x3. The history is provided by the patient and a relative. Altered Mental Status   Presenting symptoms comment:  Seeing things when closes eyes  Most recent episode: since yesterday.   Timing:  Constant  Progression:  Unchanged  Chronicity:  New  Context comment:  New medication valtrex  Associated symptoms: hallucinations, palpitations and rash    Associated symptoms: no abdominal pain, no fever, no headaches, no slurred speech, no visual change, no vomiting and no weakness    Dizziness   Quality:  Imbalance  Onset quality:  Gradual  Duration:  4 months  Progression:  Worsening  Chronicity:  New  Associated symptoms: palpitations and shortness of breath    Associated symptoms: no chest pain, no headaches, no syncope, no vision changes, no vomiting and no weakness    Shortness of breath:     Onset quality:  Gradual    Duration:  1 month    Timing:  Constant    Progression:  Unchanged      Nursing Notes were reviewed    REVIEW OF SYSTEMS    (2-9 systems for level 4, 10 or more for level 5)     Review of Systems Constitutional: Negative for fever. Respiratory: Positive for shortness of breath. Negative for cough. Cardiovascular: Positive for palpitations. Negative for chest pain and syncope. Gastrointestinal: Negative for abdominal pain and vomiting. Genitourinary: Positive for difficulty urinating (x yrs). Skin: Positive for rash. Neurological: Positive for dizziness. Negative for syncope, facial asymmetry, speech difficulty, weakness, numbness and headaches. Psychiatric/Behavioral: Positive for hallucinations. All other systems reviewed and are negative. Positives and Pertinent negatives as per HPI.        PAST MEDICAL HISTORY     Past Medical History:   Diagnosis Date    Atrial fibrillation ([address]     CHF (congestive heart failure) (HCC)     COPD (chronic obstructive pulmonary disease) (HCC)     Elevated PSA     Enlarged prostate     Hypertension     Hyperthyroidism     resolved    Right leg DVT ([address] 2/2/2015         SURGICAL HISTORY     Past Surgical History:   Procedure Laterality Date    CARDIAC SURGERY      mitral valve repair    COLONOSCOPY  06/19/2017    COLONOSCOPY  05/22/2018    polyp removal, sigmoid diverticulosis, rectal bleeding    CYSTOSCOPY  1/20/15    Clot Evacuation    CYSTOSCOPY  2/18/15    HERNIA REPAIR      TURP  2/18/15    CLOT EVACUATION, TRANSURETHRAL RESECTION OF THE PROSTATE         CURRENTMEDICATIONS       Previous Medications    ALBUTEROL SULFATE HFA (PROVENTIL HFA) 108 (90 BASE) MCG/ACT INHALER    Inhale 2 puffs into the lungs every 6 hours as needed for Wheezing    ASPIRIN 81 MG TABLET    Take 81 mg by mouth daily    CARVEDILOL (COREG) 12.5 MG TABLET    Take 0.5 tablets by mouth 2 times daily (with meals)    FLUTICASONE-SALMETEROL (ADVAIR DISKUS) 250-50 MCG/DOSE AEPB    Inhale 1 puff into the lungs 2 times daily    FLUTICASONE-UMECLIDIN-VILANT (TRELEGY ELLIPTA) 100-62.5-25 MCG/INH AEPB    Inhale 1 puff into the lungs daily    FUROSEMIDE (LASIX) 40 MG TABLET    Take 1 tablet by mouth daily    ISOSORBIDE MONONITRATE (IMDUR) 30 MG CR TABLET    Take 30 mg by mouth daily    LEVALBUTEROL (XOPENEX) 0.63 MG/3ML NEBULIZATION    Take 3 mLs by nebulization every 6 hours as needed for Wheezing    LISINOPRIL (PRINIVIL;ZESTRIL) 5 MG TABLET    Take 1 tablet by mouth daily    SPACER/AERO-HOLDING CHAMBERS (E-Z SPACER) MARIFER    1 Device by Does not apply route daily as needed (prn with Albuterol)    UMECLIDINIUM BROMIDE 62.5 MCG/INH AEPB    Inhale 62.5 mcg into the lungs daily    VALACYCLOVIR (VALTREX) 1 G TABLET    Take 1 tablet by mouth 3 times daily for 7 days         ALLERGIES     Pcn [penicillins]    FAMILYHISTORY     History reviewed. No pertinent family history.        SOCIAL HISTORY       Social History     Socioeconomic History    Marital status:      Spouse name: None    Number of children: None    Years of education: None    Highest education level: None   Occupational History    None   Social Needs    Financial resource strain: None    Food insecurity     Worry: None     Inability: None    Transportation needs     Medical: None     Non-medical: None   Tobacco Use    Smoking status: Former Smoker     Years: 30.00     Last attempt to quit: 2002     Years since quittin.7    Smokeless tobacco: Never Used    Tobacco comment: 2 pipes per day   Substance and Sexual Activity    Alcohol use: No    Drug use: No    Sexual activity: Yes     Partners: Female   Lifestyle    Physical activity     Days per week: None     Minutes per session: None    Stress: None   Relationships    Social connections     Talks on phone: None     Gets together: None     Attends Voodoo service: None     Active member of club or organization: None     Attends meetings of clubs or organizations: None     Relationship status: None    Intimate partner violence     Fear of current or ex partner: None     Emotionally abused: None     Physically abused: None     Forced sexual activity: None   Other Topics Concern    None   Social History Narrative    None       SCREENINGS    Adriana Coma Scale  Eye Opening: Spontaneous  Best Verbal Response: Oriented  Best Motor Response: Obeys commands  Adriana Coma Scale Score: 15        PHYSICAL EXAM    (up to 7 for level 4, 8 or more for level 5)     ED Triage Vitals [09/21/20 1356]   BP Temp Temp Source Pulse Resp SpO2 Height Weight   (!) 231/208 98 °F (36.7 °C) Temporal 58 18 96 % 5' 8\" (1.727 m) 165 lb (74.8 kg)       Physical Exam  Vitals signs and nursing note reviewed. Constitutional:       Appearance: He is well-developed. HENT:      Head: Normocephalic and atraumatic. Right Ear: Tympanic membrane and ear canal normal. No drainage, swelling or tenderness. Tympanic membrane is not erythematous or bulging. Left Ear: Tympanic membrane and ear canal normal.      Ears:      Comments: 2 small healing skin lesions at right ear lobe. No skin lesions to canal or at TM     Mouth/Throat:      Mouth: Mucous membranes are moist.      Pharynx: Oropharynx is clear. No pharyngeal swelling or posterior oropharyngeal erythema. Eyes:      General: No visual field deficit. Extraocular Movements: Extraocular movements intact. Conjunctiva/sclera: Conjunctivae normal.      Pupils: Pupils are equal, round, and reactive to light. Neck:      Musculoskeletal: Normal range of motion and neck supple. Cardiovascular:      Rate and Rhythm: Normal rate. Rhythm irregularly irregular. Pulmonary:      Effort: Pulmonary effort is normal. No respiratory distress. Breath sounds: Normal breath sounds. No stridor. No wheezing, rhonchi or rales. Abdominal:      General: Bowel sounds are normal.      Palpations: Abdomen is soft. Abdomen is not rigid. Tenderness: There is no abdominal tenderness. There is no guarding or rebound. Musculoskeletal: Normal range of motion. Right lower leg: No edema. Left lower leg: No edema. [employer] [place] [facility]   Phone [phone]   PROTIME-INR - Abnormal; Notable for the following components:    Protime 13.3 (*)     All other components within normal limits    Narrative:     Performed at:  [facility],  [place] [facility]   Phone [phone]   APTT - Abnormal; Notable for the following components:    aPTT 48.6 (*)     All other components within normal limits    Narrative:     Performed at:  [place] [address],  [employer] [facility]   Phone [phone]   MICROSCOPIC URINALYSIS - Abnormal; Notable for the following components:    WBC, UA >100 (*)     RBC, UA 21-50 (*)     Bacteria, UA 4+ (*)     All other components within normal limits    Narrative:     Performed at:  [place] [address],  [employer]   Phone [phone]   CULTURE, URINE   TSH WITH REFLEX    Narrative:     Performed at:  [place] [address],  [employer] [place] [employer]   Phone [phone]     Results for orders placed or performed during the hospital encounter of 09/21/20   CBC Auto Differential   Result Value Ref Range    WBC 16.3 (H) 4.0 - 11.0 K/uL    RBC 7.35 (H) 4.20 - 5.90 M/uL    Hemoglobin 19.6 (H) 13.5 - 17.5 g/dL    Hematocrit 61.3 (H) 40.5 - 52.5 %    MCV 83.5 80.0 - 100.0 fL    MCH 26.6 26.0 - 34.0 pg    MCHC 31.9 31.0 - 36.0 g/dL    RDW 18.9 (H) 12.4 - 15.4 %    Platelets 823 821 - 204 K/uL    MPV 7.9 5.0 - 10.5 fL    PLATELET SLIDE REVIEW Adequate     SLIDE REVIEW see below     Neutrophils % 95.0 %    Lymphocytes % 4.0 %    Monocytes % 0.0 %    Eosinophils % 1.0 %    Basophils % 0.0 %    Neutrophils Absolute 15.5 (H) 1.7 - 7.7 K/uL    Lymphocytes Absolute 0.7 (L) 1.0 - 5.1 K/uL    Monocytes Absolute 0.0 0.0 - 1.3 K/uL    Eosinophils Absolute 0.2 0.0 - 0.6 K/uL    Basophils Absolute 0.0 0.0 - 0.2 K/uL    Anisocytosis 1+ (A) Comprehensive Metabolic Panel w/ Reflex to MG   Result Value Ref Range    Sodium 130 (L) 136 - 145 mmol/L    Potassium reflex Magnesium 4.6 3.5 - 5.1 mmol/L    Chloride 96 (L) 99 - 110 mmol/L    CO2 19 (L) 21 - 32 mmol/L    Anion Gap 15 3 - 16    Glucose 177 (H) 70 - 99 mg/dL    BUN 62 (H) 7 - 20 mg/dL    CREATININE 3.7 (H) 0.8 - 1.3 mg/dL    GFR Non- 16 (A) >60    GFR  19 (A) >60    Calcium 9.2 8.3 - 10.6 mg/dL    Total Protein 7.6 6.4 - 8.2 g/dL    Alb 3.9 3.4 - 5.0 g/dL    Albumin/Globulin Ratio 1.1 1.1 - 2.2    Total Bilirubin 0.8 0.0 - 1.0 mg/dL    Alkaline Phosphatase 114 40 - 129 U/L    ALT 10 10 - 40 U/L    AST 14 (L) 15 - 37 U/L    Globulin 3.7 g/dL   Urinalysis Reflex to Culture    Specimen: Urine, clean catch   Result Value Ref Range    Color, UA Yellow Straw/Yellow    Clarity, UA CLOUDY (A) Clear    Glucose, Ur Negative Negative mg/dL    Bilirubin Urine Negative Negative    Ketones, Urine Negative Negative mg/dL    Specific Gravity, UA 1.020 1.005 - 1.030    Blood, Urine LARGE (A) Negative    pH, UA 5.5 5.0 - 8.0    Protein,  (A) Negative mg/dL    Urobilinogen, Urine 0.2 <2.0 E.U./dL    Nitrite, Urine Negative Negative    Leukocyte Esterase, Urine MODERATE (A) Negative    Microscopic Examination YES     Urine Type NotGiven     Urine Reflex to Culture Yes    Troponin   Result Value Ref Range    Troponin 0.02 (H) <0.01 ng/mL   Brain Natriuretic Peptide   Result Value Ref Range    Pro-BNP 8,830 (H) 0 - 449 pg/mL   Protime-INR   Result Value Ref Range    Protime 13.3 (H) 10.0 - 13.2 sec    INR 1.14 0.86 - 1.14   APTT   Result Value Ref Range    aPTT 48.6 (H) 24.2 - 36.2 sec   Microscopic Urinalysis   Result Value Ref Range    WBC, UA >100 (A) 0 - 5 /HPF    RBC, UA 21-50 (A) 0 - 4 /HPF    Bacteria, UA 4+ (A) None Seen /HPF   TSH with Reflex   Result Value Ref Range    TSH 2.84 0.27 - 4.20 uIU/mL   EKG 12 Lead   Result Value Ref Range    Ventricular Rate 101 BPM Atrial Rate 125 BPM    QRS Duration 152 ms    Q-T Interval 392 ms    QTc Calculation (Bazett) 508 ms    R Axis -69 degrees    T Axis 49 degrees    Diagnosis       Atrial fibrillation with rapid ventricular responseRight bundle branch blockleft axisLeft anterior fascicular block  Bifascicular block Inferior infarct (cited on or before 21-SEP-2020)Abnormal ECGWhen compared with ECG of 21-SEP-2020 14:32, (unconfirmed)RBBB is newConfirmed by ABHAY DYKES MD ([id number]) on 9/21/2020 5:09:37 PM         All other labs were within normal range or not returned as of this dictation. EKG: All EKG's are interpreted by the Emergency Department Physician in the absence of a cardiologist.  Please see their note for interpretation of EKG. RADIOLOGY:   Non-plain film images such as CT, Ultrasound and MRI are read by the radiologist. Plain radiographic images are visualized andpreliminarily interpreted by the  ED Provider with the below findings:        Interpretation perthe Radiologist below, if available at the time of this note:    CT HEAD WO CONTRAST   Final Result   No acute intracranial abnormality. XR CHEST PORTABLE   Final Result   No acute cardiopulmonary disease. Enlargement of the cardiac silhouette, which appears increased compared to   baseline. Cardiomegaly versus pericardial effusion. Ct Head Wo Contrast    Result Date: 9/21/2020  EXAMINATION: CT OF THE HEAD WITHOUT CONTRAST  9/21/2020 2:38 pm TECHNIQUE: CT of the head was performed without the administration of intravenous contrast. Dose modulation, iterative reconstruction, and/or weight based adjustment of the mA/kV was utilized to reduce the radiation dose to as low as reasonably achievable. COMPARISON: None. HISTORY: ORDERING SYSTEM PROVIDED HISTORY: dizzy, hallucinations TECHNOLOGIST PROVIDED HISTORY: Has a \"code stroke\" or \"stroke alert\" been called? ->No Reason for exam:->dizzy, hallucinations Reason for Exam: dizziness & hallucinations Acuity: Unknown Type of Exam: Unknown FINDINGS: BRAIN/VENTRICLES: There is no acute intracranial hemorrhage, mass effect or midline shift. No abnormal extra-axial fluid collection. The gray-white differentiation is maintained without evidence of an acute infarct. There is no evidence of hydrocephalus. Generalized volume loss is noted. There is scattered low-attenuation in the periventricular and subcortical white matter, consistent with microvascular ischemic disease. ORBITS: The visualized portion of the orbits demonstrate no acute abnormality. SINUSES: The visualized paranasal sinuses and mastoid air cells demonstrate no acute abnormality. SOFT TISSUES/SKULL:  No acute abnormality of the visualized skull or soft tissues. No acute intracranial abnormality. Xr Chest Portable    Result Date: 9/21/2020  EXAMINATION: ONE XRAY VIEW OF THE CHEST 9/21/2020 2:41 pm COMPARISON: None. HISTORY: ORDERING SYSTEM PROVIDED HISTORY: sob TECHNOLOGIST PROVIDED HISTORY: Reason for exam:->sob Reason for Exam: dizzy FINDINGS: Sternotomy wires are present. The cardiac silhouette is globally prominent. The pulmonary vessels are normal in caliber. No focal airspace disease or pleural effusion is identified. No acute cardiopulmonary disease. Enlargement of the cardiac silhouette, which appears increased compared to baseline. Cardiomegaly versus pericardial effusion.          PROCEDURES   Unless otherwise noted below, none     Procedures    CRITICAL CARE TIME   N/A    CONSULTS:  IP CONSULT TO HOSPITALIST  IP CONSULT TO NEPHROLOGY      EMERGENCY DEPARTMENT COURSE and DIFFERENTIAL DIAGNOSIS/MDM:   Vitals:    Vitals:    09/21/20 1800 09/21/20 1815 09/21/20 1820 09/21/20 1825   BP: (!) 177/101 (!) 175/100 (!) 194/106 (!) 171/107   Pulse: 92 99 96 93   Resp: 26 24 26 22   Temp:       TempSrc:       SpO2:       Weight:       Height:           Patient was given thefollowing medications:  Medications   aztreonam (AZACTAM) 500 mg in dextrose 5 % 50 mL IVPB (0 mg Intravenous Stopped 9/21/20 1730)   carvedilol (COREG) tablet 6.25 mg (6.25 mg Oral Given 9/21/20 1507)   lisinopril (PRINIVIL;ZESTRIL) tablet 5 mg (5 mg Oral Given 9/21/20 1507)   hydrALAZINE (APRESOLINE) injection 10 mg (10 mg Intravenous Given 9/21/20 1614)   LORazepam (ATIVAN) injection 0.5 mg (0.5 mg Intravenous Given 9/21/20 1712)       4:02 PM EDT  UTI, he has PNC allergy- clem johnsons syndrome. Spoke with pharmacy recommends aztreonam 500mg q 12hr, ordered. Spoke with [facility] hospitalist and discussed symptoms, exam, objective data and clinical course. Disposition and plan agreed upon. She will admit the patient and give/enter admitting orders. 5:01 PM EDT  Recheck patient. Daughter is here with him now requesting to get something to help with his hallucinations they are bothering him patient states he has not been able to get any sleep because of the hallucinations. 0.5 mg Ativan ordered to help him sleep. FINAL IMPRESSION      1. Hypertensive urgency    2. Dizziness    3. Bacterial urinary tract infection    4. Visual hallucinations    5. Acute kidney injury superimposed on chronic kidney disease ([facility] [address]    6. Chronic a-fib    7. Lesion of skin of scalp          DISPOSITION/PLAN   DISPOSITION     PATIENT REFERREDTO:  No follow-up provider specified.     DISCHARGE MEDICATIONS:  New Prescriptions    No medications on file       DISCONTINUED MEDICATIONS:  Discontinued Medications    No medications on file              (Please note that portions ofthis note were completed with a voice recognition program.  Efforts were made to edit the dictations but occasionally words are mis-transcribed.)    Von Andrews PA-C (electronically signed)            Titus Ennis PA-C  09/21/20 1931

## 2020-09-22 ENCOUNTER — APPOINTMENT (OUTPATIENT)
Dept: ULTRASOUND IMAGING | Age: 80
DRG: 683 | End: 2020-09-22
Payer: MEDICARE

## 2020-09-22 LAB
ANION GAP SERPL CALCULATED.3IONS-SCNC: 14 MMOL/L (ref 3–16)
BASOPHILS ABSOLUTE: 0.1 K/UL (ref 0–0.2)
BASOPHILS RELATIVE PERCENT: 0.7 %
BUN BLDV-MCNC: 61 MG/DL (ref 7–20)
C3 COMPLEMENT: 80.4 MG/DL (ref 90–180)
C4 COMPLEMENT: 28 MG/DL (ref 10–40)
CALCIUM SERPL-MCNC: 9.1 MG/DL (ref 8.3–10.6)
CHLORIDE BLD-SCNC: 101 MMOL/L (ref 99–110)
CO2: 18 MMOL/L (ref 21–32)
CREAT SERPL-MCNC: 3.6 MG/DL (ref 0.8–1.3)
CREATININE URINE: 59.2 MG/DL (ref 39–259)
EOSINOPHILS ABSOLUTE: 0.1 K/UL (ref 0–0.6)
EOSINOPHILS RELATIVE PERCENT: 0.5 %
GFR AFRICAN AMERICAN: 20
GFR NON-AFRICAN AMERICAN: 16
GLUCOSE BLD-MCNC: 109 MG/DL (ref 70–99)
HBV SURFACE AB TITR SER: <3.5 MIU/ML
HCT VFR BLD CALC: 60.8 % (ref 40.5–52.5)
HEMOGLOBIN: 19.3 G/DL (ref 13.5–17.5)
HEPATITIS C ANTIBODY INTERPRETATION: NORMAL
LYMPHOCYTES ABSOLUTE: 0.9 K/UL (ref 1–5.1)
LYMPHOCYTES RELATIVE PERCENT: 4.9 %
MCH RBC QN AUTO: 26.5 PG (ref 26–34)
MCHC RBC AUTO-ENTMCNC: 31.8 G/DL (ref 31–36)
MCV RBC AUTO: 83.3 FL (ref 80–100)
MONOCYTES ABSOLUTE: 0.7 K/UL (ref 0–1.3)
MONOCYTES RELATIVE PERCENT: 4.2 %
NEUTROPHILS ABSOLUTE: 15.9 K/UL (ref 1.7–7.7)
NEUTROPHILS RELATIVE PERCENT: 89.7 %
PDW BLD-RTO: 19.5 % (ref 12.4–15.4)
PLATELET # BLD: 324 K/UL (ref 135–450)
PMV BLD AUTO: 8.1 FL (ref 5–10.5)
POTASSIUM REFLEX MAGNESIUM: 4.3 MMOL/L (ref 3.5–5.1)
PROTEIN PROTEIN: 167 MG/DL
RBC # BLD: 7.3 M/UL (ref 4.2–5.9)
SODIUM BLD-SCNC: 133 MMOL/L (ref 136–145)
SODIUM URINE: 69 MMOL/L
TROPONIN: 0.02 NG/ML
TROPONIN: 0.03 NG/ML
WBC # BLD: 17.7 K/UL (ref 4–11)

## 2020-09-22 PROCEDURE — 6370000000 HC RX 637 (ALT 250 FOR IP): Performed by: INTERNAL MEDICINE

## 2020-09-22 PROCEDURE — 99233 SBSQ HOSP IP/OBS HIGH 50: CPT | Performed by: INTERNAL MEDICINE

## 2020-09-22 PROCEDURE — 86702 HIV-2 ANTIBODY: CPT

## 2020-09-22 PROCEDURE — 84300 ASSAY OF URINE SODIUM: CPT

## 2020-09-22 PROCEDURE — 2060000000 HC ICU INTERMEDIATE R&B

## 2020-09-22 PROCEDURE — 2500000003 HC RX 250 WO HCPCS: Performed by: PHYSICIAN ASSISTANT

## 2020-09-22 PROCEDURE — 2500000003 HC RX 250 WO HCPCS: Performed by: INTERNAL MEDICINE

## 2020-09-22 PROCEDURE — 36415 COLL VENOUS BLD VENIPUNCTURE: CPT

## 2020-09-22 PROCEDURE — 85025 COMPLETE CBC W/AUTO DIFF WBC: CPT

## 2020-09-22 PROCEDURE — 6370000000 HC RX 637 (ALT 250 FOR IP): Performed by: PHYSICIAN ASSISTANT

## 2020-09-22 PROCEDURE — 97530 THERAPEUTIC ACTIVITIES: CPT

## 2020-09-22 PROCEDURE — 86803 HEPATITIS C AB TEST: CPT

## 2020-09-22 PROCEDURE — 82570 ASSAY OF URINE CREATININE: CPT

## 2020-09-22 PROCEDURE — 84165 PROTEIN E-PHORESIS SERUM: CPT

## 2020-09-22 PROCEDURE — 86038 ANTINUCLEAR ANTIBODIES: CPT

## 2020-09-22 PROCEDURE — 87390 HIV-1 AG IA: CPT

## 2020-09-22 PROCEDURE — 86706 HEP B SURFACE ANTIBODY: CPT

## 2020-09-22 PROCEDURE — 86160 COMPLEMENT ANTIGEN: CPT

## 2020-09-22 PROCEDURE — 97161 PT EVAL LOW COMPLEX 20 MIN: CPT

## 2020-09-22 PROCEDURE — 84484 ASSAY OF TROPONIN QUANT: CPT

## 2020-09-22 PROCEDURE — 94640 AIRWAY INHALATION TREATMENT: CPT

## 2020-09-22 PROCEDURE — 86704 HEP B CORE ANTIBODY TOTAL: CPT

## 2020-09-22 PROCEDURE — 97165 OT EVAL LOW COMPLEX 30 MIN: CPT

## 2020-09-22 PROCEDURE — 86701 HIV-1ANTIBODY: CPT

## 2020-09-22 PROCEDURE — 97116 GAIT TRAINING THERAPY: CPT

## 2020-09-22 PROCEDURE — 94761 N-INVAS EAR/PLS OXIMETRY MLT: CPT

## 2020-09-22 PROCEDURE — 84155 ASSAY OF PROTEIN SERUM: CPT

## 2020-09-22 PROCEDURE — 76770 US EXAM ABDO BACK WALL COMP: CPT

## 2020-09-22 PROCEDURE — 83883 ASSAY NEPHELOMETRY NOT SPEC: CPT

## 2020-09-22 PROCEDURE — 2580000003 HC RX 258: Performed by: INTERNAL MEDICINE

## 2020-09-22 PROCEDURE — 2580000003 HC RX 258: Performed by: PHYSICIAN ASSISTANT

## 2020-09-22 PROCEDURE — 80048 BASIC METABOLIC PNL TOTAL CA: CPT

## 2020-09-22 PROCEDURE — 84156 ASSAY OF PROTEIN URINE: CPT

## 2020-09-22 RX ORDER — SODIUM CHLORIDE 0.9 % (FLUSH) 0.9 %
10 SYRINGE (ML) INJECTION PRN
Status: DISCONTINUED | OUTPATIENT
Start: 2020-09-22 | End: 2020-09-23 | Stop reason: HOSPADM

## 2020-09-22 RX ORDER — CARVEDILOL 6.25 MG/1
6.25 TABLET ORAL 2 TIMES DAILY WITH MEALS
Status: DISCONTINUED | OUTPATIENT
Start: 2020-09-22 | End: 2020-09-22

## 2020-09-22 RX ORDER — ACETAMINOPHEN 325 MG/1
650 TABLET ORAL EVERY 6 HOURS PRN
Status: DISCONTINUED | OUTPATIENT
Start: 2020-09-22 | End: 2020-09-23 | Stop reason: HOSPADM

## 2020-09-22 RX ORDER — ONDANSETRON 2 MG/ML
4 INJECTION INTRAMUSCULAR; INTRAVENOUS EVERY 6 HOURS PRN
Status: DISCONTINUED | OUTPATIENT
Start: 2020-09-22 | End: 2020-09-23 | Stop reason: HOSPADM

## 2020-09-22 RX ORDER — SODIUM CHLORIDE 0.9 % (FLUSH) 0.9 %
10 SYRINGE (ML) INJECTION EVERY 12 HOURS SCHEDULED
Status: DISCONTINUED | OUTPATIENT
Start: 2020-09-22 | End: 2020-09-23 | Stop reason: HOSPADM

## 2020-09-22 RX ORDER — ALBUTEROL SULFATE 90 UG/1
2 AEROSOL, METERED RESPIRATORY (INHALATION) EVERY 6 HOURS PRN
Status: DISCONTINUED | OUTPATIENT
Start: 2020-09-22 | End: 2020-09-22

## 2020-09-22 RX ORDER — LEVALBUTEROL 1.25 MG/.5ML
0.63 SOLUTION, CONCENTRATE RESPIRATORY (INHALATION) EVERY 6 HOURS PRN
Status: DISCONTINUED | OUTPATIENT
Start: 2020-09-22 | End: 2020-09-22

## 2020-09-22 RX ORDER — ISOSORBIDE MONONITRATE 30 MG/1
30 TABLET, EXTENDED RELEASE ORAL DAILY
Status: DISCONTINUED | OUTPATIENT
Start: 2020-09-22 | End: 2020-09-23 | Stop reason: HOSPADM

## 2020-09-22 RX ORDER — SODIUM CHLORIDE 9 MG/ML
INJECTION, SOLUTION INTRAVENOUS CONTINUOUS
Status: DISCONTINUED | OUTPATIENT
Start: 2020-09-22 | End: 2020-09-22

## 2020-09-22 RX ORDER — HEPARIN SODIUM 5000 [USP'U]/ML
5000 INJECTION, SOLUTION INTRAVENOUS; SUBCUTANEOUS EVERY 8 HOURS
Status: DISCONTINUED | OUTPATIENT
Start: 2020-09-22 | End: 2020-09-23 | Stop reason: HOSPADM

## 2020-09-22 RX ORDER — HYDRALAZINE HYDROCHLORIDE 20 MG/ML
10 INJECTION INTRAMUSCULAR; INTRAVENOUS EVERY 6 HOURS PRN
Status: DISCONTINUED | OUTPATIENT
Start: 2020-09-22 | End: 2020-09-23 | Stop reason: HOSPADM

## 2020-09-22 RX ORDER — CARVEDILOL 6.25 MG/1
12.5 TABLET ORAL 2 TIMES DAILY WITH MEALS
Status: DISCONTINUED | OUTPATIENT
Start: 2020-09-22 | End: 2020-09-23 | Stop reason: HOSPADM

## 2020-09-22 RX ORDER — LEVALBUTEROL TARTRATE 45 UG/1
2 AEROSOL, METERED ORAL EVERY 4 HOURS PRN
Status: DISCONTINUED | OUTPATIENT
Start: 2020-09-22 | End: 2020-09-23 | Stop reason: HOSPADM

## 2020-09-22 RX ORDER — BUDESONIDE AND FORMOTEROL FUMARATE DIHYDRATE 160; 4.5 UG/1; UG/1
2 AEROSOL RESPIRATORY (INHALATION) 2 TIMES DAILY
Status: DISCONTINUED | OUTPATIENT
Start: 2020-09-22 | End: 2020-09-23 | Stop reason: HOSPADM

## 2020-09-22 RX ORDER — PROMETHAZINE HYDROCHLORIDE 25 MG/1
12.5 TABLET ORAL EVERY 6 HOURS PRN
Status: DISCONTINUED | OUTPATIENT
Start: 2020-09-22 | End: 2020-09-23 | Stop reason: HOSPADM

## 2020-09-22 RX ORDER — CLONIDINE HYDROCHLORIDE 0.1 MG/1
0.1 TABLET ORAL EVERY 4 HOURS PRN
Status: DISCONTINUED | OUTPATIENT
Start: 2020-09-22 | End: 2020-09-22

## 2020-09-22 RX ORDER — BUDESONIDE AND FORMOTEROL FUMARATE DIHYDRATE 160; 4.5 UG/1; UG/1
2 AEROSOL RESPIRATORY (INHALATION) 2 TIMES DAILY
Status: DISCONTINUED | OUTPATIENT
Start: 2020-09-22 | End: 2020-09-22

## 2020-09-22 RX ORDER — CLONIDINE HYDROCHLORIDE 0.1 MG/1
0.1 TABLET ORAL EVERY 4 HOURS PRN
Status: DISCONTINUED | OUTPATIENT
Start: 2020-09-22 | End: 2020-09-23 | Stop reason: HOSPADM

## 2020-09-22 RX ORDER — POLYETHYLENE GLYCOL 3350 17 G/17G
17 POWDER, FOR SOLUTION ORAL DAILY PRN
Status: DISCONTINUED | OUTPATIENT
Start: 2020-09-22 | End: 2020-09-23 | Stop reason: HOSPADM

## 2020-09-22 RX ORDER — ASPIRIN 81 MG/1
81 TABLET, CHEWABLE ORAL DAILY
Status: DISCONTINUED | OUTPATIENT
Start: 2020-09-22 | End: 2020-09-23 | Stop reason: HOSPADM

## 2020-09-22 RX ORDER — ACETAMINOPHEN 650 MG/1
650 SUPPOSITORY RECTAL EVERY 6 HOURS PRN
Status: DISCONTINUED | OUTPATIENT
Start: 2020-09-22 | End: 2020-09-23 | Stop reason: HOSPADM

## 2020-09-22 RX ADMIN — Medication 2 PUFF: at 19:50

## 2020-09-22 RX ADMIN — ISOSORBIDE MONONITRATE 30 MG: 30 TABLET ORAL at 10:34

## 2020-09-22 RX ADMIN — AZTREONAM 250 MG: 1 INJECTION, POWDER, LYOPHILIZED, FOR SOLUTION INTRAMUSCULAR; INTRAVENOUS at 05:12

## 2020-09-22 RX ADMIN — AZTREONAM 250 MG: 1 INJECTION, POWDER, LYOPHILIZED, FOR SOLUTION INTRAMUSCULAR; INTRAVENOUS at 13:00

## 2020-09-22 RX ADMIN — SODIUM CHLORIDE: 9 INJECTION, SOLUTION INTRAVENOUS at 05:12

## 2020-09-22 RX ADMIN — ASPIRIN 81 MG CHEWABLE TABLET 81 MG: 81 TABLET CHEWABLE at 10:34

## 2020-09-22 RX ADMIN — CARVEDILOL 6.25 MG: 6.25 TABLET, FILM COATED ORAL at 10:34

## 2020-09-22 RX ADMIN — CLONIDINE HYDROCHLORIDE 0.1 MG: 0.1 TABLET ORAL at 10:34

## 2020-09-22 RX ADMIN — Medication 2 PUFF: at 08:06

## 2020-09-22 RX ADMIN — Medication 10 ML: at 21:48

## 2020-09-22 RX ADMIN — AZTREONAM 250 MG: 1 INJECTION, POWDER, LYOPHILIZED, FOR SOLUTION INTRAMUSCULAR; INTRAVENOUS at 21:47

## 2020-09-22 RX ADMIN — CARVEDILOL 12.5 MG: 6.25 TABLET, FILM COATED ORAL at 18:07

## 2020-09-22 ASSESSMENT — PAIN SCALES - GENERAL
PAINLEVEL_OUTOF10: 0
PAINLEVEL_OUTOF10: 0

## 2020-09-22 NOTE — PROGRESS NOTES
RESPIRATORY THERAPY ASSESSMENT    Name:  Day Antonio  Medical Record Number:  [id number]  Age: [de-identified] y.o. Gender: male  : 1940  Today's Date:  2020  Room:  /0324-01    Assessment     Is the patient being admitted for a COPD or Asthma exacerbation? No   (If yes the patient will be seen every 4 hours for the first 24 hours and then reassessed)    Patient Admission Diagnosis      Allergies  Allergies   Allergen Reactions    Pcn [Penicillins] Rash     Skin peeling Angelia Randall Michel reaction       Minimum Predicted Vital Capacity:               Actual Vital Capacity:                    Pulmonary History:COPD and CHF/Pulmonary Edema  Home Oxygen Therapy:  room air  Home Respiratory Therapy:Albuterol, Levalbuterol, Salmeterol/Fluticasone Propionate and Vilanterol/Fluticasone Furoate   Current Respiratory Therapy:  Symbicort 160, Albuterol, Xopenex          Respiratory Severity Index(RSI)   Patients with orders for inhalation medications, oxygen, or any therapeutic treatment modality will be placed on Respiratory Protocol. They will be assessed with the first treatment and at least every 72 hours thereafter. The following severity scale will be used to determine frequency of treatment intervention.     Smoking History: Pulmonary Disease or Smoking History, Greater than 15 pack year = 2    Social History  Social History     Tobacco Use    Smoking status: Former Smoker     Years: 30.00     Last attempt to quit: 2002     Years since quittin.7    Smokeless tobacco: Never Used    Tobacco comment: 2 pipes per day   Substance Use Topics    Alcohol use: No    Drug use: No       Recent Surgical History: None = 0  Past Surgical History  Past Surgical History:   Procedure Laterality Date    CARDIAC SURGERY      mitral valve repair    COLONOSCOPY  2017    COLONOSCOPY  2018    polyp removal, sigmoid diverticulosis, rectal bleeding    CYSTOSCOPY  1/20/15    Clot Evacuation    CYSTOSCOPY  2/18/15    HERNIA REPAIR      TURP  2/18/15    CLOT EVACUATION, TRANSURETHRAL RESECTION OF THE PROSTATE       Level of Consciousness: Alert, Follows Commands but Disoriented = 1    Level of Activity: Mostly sedentary, minimal walking = 2    Respiratory Pattern: Regular Pattern; RR 8-20 = 0    Breath Sounds: Clear = 0    Sputum   ,  ,    Cough: Strong, spontaneous, non-productive = 0    Vital Signs   BP (!) 168/87   Pulse 96   Temp 97.2 °F (36.2 °C) (Oral)   Resp 18   Ht 5' 8\" (1.727 m)   Wt 165 lb (74.8 kg)   SpO2 96%   BMI 25.09 kg/m²   SPO2 (COPD values may differ): Greater than or equal to 92% on room air = 0    Peak Flow (asthma only): not applicable = 0    RSI: 5-6 = Q4hr PRN (every four hours as needed) for dyspnea        Plan       Goals: medication delivery, mobilize retained secretions, volume expansion and improve oxygenation    Patient/caregiver was educated on the proper method of use for Respiratory Care Devices:  Yes      Level of patient/caregiver understanding able to:   ? Verbalize understanding   ? Demonstrate understanding       ? Teach back        ? Needs reinforcement       ? No available caregiver               ? Other:     Response to education:  Good     Is patient being placed on Home Treatment Regimen? No     Does the patient have everything they need prior to discharge? NA     Comments: pt assessed & chart reviewed    Plan of Care: Symbicort 160 2 puff BID, Xopenex 2 puff Q4 PRN    Electronically signed by Queta Chatman RCP on 9/22/2020 at 3:55 AM    Respiratory Protocol Guidelines     1. Assessment and treatment by Respiratory Therapy will be initiated for medication and therapeutic interventions upon initiation of aerosolized medication. 2. Physician will be contacted for respiratory rate (RR) greater than 35 breaths per minute. Therapy will be held for heart rate (HR) greater than 140 beats per minute, pending direction from physician.   3. Bronchodilators

## 2020-09-22 NOTE — PROGRESS NOTES
09/21/20  1402 09/22/20  0646   * 133*   K 4.6 4.3   CL 96* 101   CO2 19* 18*   BUN 62* 61*   CREATININE 3.7* 3.6*     LIVER PROFILE:   Recent Labs     09/21/20  1402   AST 14*   ALT 10   BILITOT 0.8   ALKPHOS 114     PT/INR:   Recent Labs     09/21/20  1402   PROTIME 13.3*   INR 1.14       CULTURES  Urine Cx: pending      RADIOLOGY  CT HEAD WO CONTRAST   Final Result   No acute intracranial abnormality. XR CHEST PORTABLE   Final Result   No acute cardiopulmonary disease. Enlargement of the cardiac silhouette, which appears increased compared to   baseline. Cardiomegaly versus pericardial effusion. US RETROPERITONEAL COMPLETE    (Results Pending)     EKG:   Atrial fibrillation with rapid ventricular response  Right bundle branch block  left axis  Left anterior fascicular block  Bifascicular block    Prior Imaging   Echo 11/7/18  Study Conclusions    - Left ventricle: The cavity size was normal. There was mild  concentric hypertrophy. Systolic function was severely reduced. The calculated ejection fraction was in the range of 20% to 25%. Stroke volume: 39ml. Stroke volume/bsa: 19ml/m^2. - Aortic valve: Peak gradient (S): 4mm Hg.  - Mitral valve: Prior procedures included surgical repair. There  was mild regurgitation. Valve area by continuity equation (using  LVOT flow): 1.5cm^2.  - Left atrium: The atrium was markedly dilated. - Right atrium: The atrium was dilated. - Tricuspid valve: There was moderate regurgitation.  - Inferior vena cava: The vessel was dilated; the respirophasic  diameter changes were in the normal range (= 50%); findings are  consistent with mildly elevated central venous pressure. - Pulmonary arteries: PA peak pressure: 50mm Hg (S). Barbara Nettles have reviewed the chart on [employer] and personally interviewed and examined patient, reviewed the data (labs and imaging) and after discussion with my PA formulated the plan.   Agree with note with the following edits. HPI:     Patient admitted to hospital with hallucinations. He was recently treated for shingles in the scalp with valacyclovir. His symptoms started after he was given valacyclovir. Patient has baseline chronic kidney disease. Creatinine was 2.0 in October 2018. When he had blood work done a week ago his creatinine was 4. There is no  interim labs. This am he is better. I reviewed the patient's Past Medical History, Past Surgical History, Medications, and Allergies. Physical exam:    BP (!) 171/96   Pulse 94   Temp 97.5 °F (36.4 °C) (Oral)   Resp 18   Ht 5' 8\" (1.727 m)   Wt 165 lb (74.8 kg)   SpO2 94%   BMI 25.09 kg/m²     Gen: No distress. Alert. Eyes: PERRL. No sclera icterus. No conjunctival injection. ENT: No discharge. Pharynx clear. Neck: Trachea midline. Normal thyroid. Resp: No accessory muscle use. No crackles. No wheezes. No rhonchi. No dullness on percussion. CV: Irregularly irregular rate and rhythm. No murmur or rub. No edema.            Assessment/Plan:    HTN Urgency   - BP in /133  - Was given home medications with minimal improvement   - PRN clonidine and hydralazine  - in the setting of SHALINI as well  - Renal US pending   - Troponin mildly elevated   - No CP    SHALINI vs CKD  - Baseline creatinine 1.7 on most recent labs from 5/2018  - Creatinine on admission 3.7-->3.6  - Renal US pending   - HOLD home Lasix, Lisinopril  - Continue IVF and monitor   - Nephrology consult     Hyponatremia  - Mild, 130  - IVF and continue to monitor   - Hold home Lasix      Cardiomegaly vs pericardial effusion   Chronic sCHF  - Last echo in 2018 with EF 20-25%  - Was following with [facility] cardiology  - CXR with worsened cardiomegaly vs pericardial effusion   - patient will follow up with cardiology OP  - Holding home diuretic 2/2 possible SHALINI   - Appears well compensated     Elevated troponin   - Troponin 0.02-->0.02-->0.03  - EKG with atrial fibrillation with bifasicular block with RBBB new compared to prior   - CXR with increased cardiomegaly vs pericardial effusion  - Continue tele monitor   - elevated troponin may be 2/2 SHALINI   - Denies any CP or SOB    UTI  - UA with + LE and  >100 EBC  - Urine Cx pending   - Continue Aztreonam, hx of SJS with PCNs     Dizziness, VH  - likely 2/2 UTI and possible SHALINI   - Continue Abx   - Supportive care and monitor   - CT without acute findings     Varicella Zoster   - Dx prior to admission on scalp   - Was started on Valtrex   - started having hallucinations after starting the medication, held on admission   - PRN symptom control     Atrial Fibrillation   - Chronic   - n baby ASA only   - Rate controlled, continue home BB     Leukocytosis   - suspect 2/2 UTI  - On Abx  - Monitor     Polycythemia   - new since prior labs   - in setting of possible SHALINI  - Hydrate and monitor   - if not improving, may require hem consult     Hyperthyroidism    - TSH WNL  - Not on home medications     COPD  - No AE   - Continue home medications     DVT Prophylaxis: Heparin   Diet: DIET GENERAL; Low Sodium (2 GM)  Code Status: Full Code    Bisi Tanner 8:25 AM 9/22/2020    LUIS Patterson 9/22/2020 11:49 AM

## 2020-09-22 NOTE — PROGRESS NOTES
ROM:    WFL,  pt able to perform all bed mobility, transfers, and gait without ROM limitation. Upper Extremity Strength:    BUE strength WFL, but not formally assessed w/ MMT    Upper Extremity Sensation    WFL    Upper Extremity Proprioception:  WFL    Coordination and Tone  Diminished    Balance  Functional Sitting Balance:  WFL  Functional Standing Balance:Diminished    Bed mobility:    Supine to sit:   Modified Independent  Sit to supine:   Not Tested  Rolling:    Not Tested  Scooting in sitting:  Modified Independent  Scooting to head of bed:   Not Tested    Bridging:   Not Tested    Transfers:    Sit to stand:  Supervision  Stand to sit:  Supervision  Bed to chair:   Supervision for management of IV pole   Standard toilet: Supervision  Bed to [facility]:  Not Tested    Dressing:      UE:   Modified Independent  LE:    Not Tested    Bathing:    UE:  Supervision to wash hands at sink   LE:  Not Tested    Eating:   Independent    Toileting:  Supervision    Activity Tolerance   Pt completed therapy session with SOB noted with activity  SpO2:   HR:   BP: 159/87    Positioning Needs:   Up in chair, call light and needs in reach. Exercise / Activities Initiated:   N/A    Patient/Family Education:   Role of OT  Recommendations for DC    Assessment of Deficits: Pt seen for Occupational therapy evaluation in acute care setting. Pt demonstrated decreased activity tolerance. However, patient functioning at baseline. No additional acute OT needs at this time.       Plan:  DC acute OT      Keli Sanches OTR/L #[id number]      If patient discharges from this facility prior to next visit, this note will serve as the Discharge Summary

## 2020-09-22 NOTE — CARE COORDINATION
Case Management Assessment  Initial Evaluation      Patient Name: Antonio Queen  YOB: 1940  Diagnosis: SHALINI (acute kidney injury) [facility]) [N17.9]  Date / Time: 9/21/2020  1:49 PM    Admission status/Date:  09/21/2020  Chart Reviewed: Yes      Patient Interviewed: Yes   Family Interviewed:  No      Hospitalization in the last 30 days:  No      Health Care Decision Maker: NEXT of Kin (spouse)    Met with: patient  Jhony Goodwin conducted  (bedside/phone): bedside    Current PCP: Rudy Richardson MD    Financial  Medicare  Precert required for SNF : NA        3 night stay required - WAIVED    ADLS  Support Systems/Care Needs: Spouse/Significant Other, Children, Family Members  Transportation: self    Meal Preparation: self    Housing  Living Arrangements: Lives w/spouse, IPTA  Steps: NA  Intent for return to present living arrangements: Yes  Identified Issues: NA    Home Care Information  Active with Home Health Care : No       Durable Medical Equiptment   DME Provider: [facility]  Equipment:   Walker___Cane___RTS___ BSC___Shower Chair___Hospital Bed___W/C____Other________  02 at ____Liter(s)---wears(frequency)_______ HHN _X__ CPAP___ BiPap___   N/A____      Home O2 Use :  No        Community Service Affiliation  Dialysis:  No    · Agency:  · Location:  · Dialysis Schedule:  · Phone:   · Fax: Other Community Services: NA    DISCHARGE PLAN: Explained Case Management role/services. Chart reviewed. Met with pt via phone and explained the role of the CM. IPTA. Lives w/sp and plans to return home. Denies any new HHC or DME needs. No other barriers to DC identified. CM not following.

## 2020-09-22 NOTE — ED NOTES
Patient transferred to PCU via stretcher by PCU nurses. Report given to the PCU nursing staff. Blood pressure has been running high. Manual blood pressure performed around 2030 and was 168/85.       Janny Olson RN  96/26/08 0798

## 2020-09-22 NOTE — CONSULTS
Nephrology Consult Note  [email or web address]        Reason for Consult: SHALINI  Consulting Physician: Denise SNIDER    HISTORY OF PRESENT ILLNESS:      The patient is a [de-identified] y.o. male with significant past medical history of afib, CHF, COPD, HTN and hyperthyroidism who presents with dizziness and hallucinations. Recently diagnosed with shingles and was started on Valacyclovir after which the symptoms started. Was brought in to the hospital for evaluation. SBP was 230smmHg on admission. Lab work showed a serum creatinine of 4.1mg/dL, previously 2.03mg/dL in 10/2018. He was started on IVF and we were consulted for further evaluation and management. Past Medical History:        Diagnosis Date    Atrial fibrillation ([facility] [address]     CHF (congestive heart failure) (HCC)     COPD (chronic obstructive pulmonary disease) (HCC)     Elevated PSA     Enlarged prostate     Hypertension     Hyperthyroidism     resolved    Right leg DVT ([facility] [address] 2/2/2015       Past Surgical History:        Procedure Laterality Date    CARDIAC SURGERY      mitral valve repair    COLONOSCOPY  06/19/2017    COLONOSCOPY  05/22/2018    polyp removal, sigmoid diverticulosis, rectal bleeding    CYSTOSCOPY  1/20/15    Clot Evacuation    CYSTOSCOPY  2/18/15    HERNIA REPAIR      TURP  2/18/15    CLOT EVACUATION, TRANSURETHRAL RESECTION OF THE PROSTATE       Current Medications:    No current facility-administered medications on file prior to encounter.       Current Outpatient Medications on File Prior to Encounter   Medication Sig Dispense Refill    furosemide (LASIX) 40 MG tablet Take 1 tablet by mouth daily 30 tablet 2    aspirin 81 MG tablet Take 81 mg by mouth daily      albuterol sulfate HFA (PROVENTIL HFA) 108 (90 Base) MCG/ACT inhaler Inhale 2 puffs into the lungs every 6 hours as needed for Wheezing 1 Inhaler 5    valACYclovir (VALTREX) 1 g tablet Take 1 tablet by mouth 3 times daily for 7 days 21 tablet 0    fluticasone-umeclidin-vilant (TRELEGY ELLIPTA) 100-62.5-25 MCG/INH AEPB Inhale 1 puff into the lungs daily 1 each 0    lisinopril (PRINIVIL;ZESTRIL) 5 MG tablet Take 1 tablet by mouth daily 30 tablet 2    carvedilol (COREG) 12.5 MG tablet Take 0.5 tablets by mouth 2 times daily (with meals) 60 tablet 3    Umeclidinium Bromide 62.5 MCG/INH AEPB Inhale 62.5 mcg into the lungs daily 30 each 1    fluticasone-salmeterol (ADVAIR DISKUS) 250-50 MCG/DOSE AEPB Inhale 1 puff into the lungs 2 times daily 1 each 5    levalbuterol (XOPENEX) 0.63 MG/3ML nebulization Take 3 mLs by nebulization every 6 hours as needed for Wheezing (Patient not taking: Reported on 2020) 125 mL 5    Spacer/Aero-Holding Chambers (E-Z SPACER) MARIFER 1 Device by Does not apply route daily as needed (prn with Albuterol) 1 Device 0    isosorbide mononitrate (IMDUR) 30 MG CR tablet Take 30 mg by mouth daily         Allergies:  Pcn [penicillins]    Social History:    Social History     Socioeconomic History    Marital status:      Spouse name: Not on file    Number of children: Not on file    Years of education: Not on file    Highest education level: Not on file   Occupational History    Not on file   Social Needs    Financial resource strain: Not on file    Food insecurity     Worry: Not on file     Inability: Not on file    Transportation needs     Medical: Not on file     Non-medical: Not on file   Tobacco Use    Smoking status: Former Smoker     Years: 30.00     Last attempt to quit: 2002     Years since quittin.7    Smokeless tobacco: Never Used    Tobacco comment: 2 pipes per day   Substance and Sexual Activity    Alcohol use: No    Drug use: No    Sexual activity: Yes     Partners: Female   Lifestyle    Physical activity     Days per week: Not on file     Minutes per session: Not on file    Stress: Not on file   Relationships    Social connections     Talks on phone: Not on file     Gets together: Not on file     Attends [employer] service: Not on file     Active member of club or organization: Not on file     Attends meetings of clubs or organizations: Not on file     Relationship status: Not on file    Intimate partner violence     Fear of current or ex partner: Not on file     Emotionally abused: Not on file     Physically abused: Not on file     Forced sexual activity: Not on file   Other Topics Concern    Not on file   Social History Narrative    Not on file       Family History:   History reviewed. No pertinent family history. REVIEW OF SYSTEMS:    Review of Systems   Unable to obtain      PHYSICAL EXAM:    Vitals:    BP (!) 171/96   Pulse 94   Temp 97.5 °F (36.4 °C) (Oral)   Resp 18   Ht 5' 8\" (1.727 m)   Wt 165 lb (74.8 kg)   SpO2 94%   BMI 25.09 kg/m²   No intake/output data recorded. I/O this shift:  In: 240 [P.O.:240]  Out: 299 [Urine:299]    Physical Exam:  Physical Exam   Physical examination not performed to conserve PPE and to decrease risk of provider exposure to COVID-19. Noted Dr. Ahmet Carrasco PE findings today. DATA:    Recent Labs     09/21/20  1402 09/22/20  0646   WBC 16.3* 17.7*   HGB 19.6* 19.3*   HCT 61.3* 60.8*   MCV 83.5 83.3    324     Recent Labs     09/21/20  1402 09/22/20  0646   * 133*   K 4.6 4.3   CL 96* 101   CO2 19* 18*   GLUCOSE 177* 109*   BUN 62* 61*   CREATININE 3.7* 3.6*   LABGLOM 16* 16*   GFRAA 19* 20*           IMPRESSION/RECOMMENDATIONS:      Suspect Advanced CKD. - In the setting of uncontrolled HTN.  - Last serum creatinine on record was 2.03mg/dL [facility]) in 10/2018.  - Urinalysis with hematuria and proteinuria. UPC 2.8g/g. Will check KWABENA, C3, C4, HIV, hepatitis, SFLC, SPEP/IF.  - Renal ultrasound showed renal atrophy with increased renal cortical echogenicity. - D/C IVF. - Renal function panel daily.  - Agree with holding Lisinopril and Lasix. - Please avoid any nephrotoxic agents such as NSAIDs or IV contrast unless deemed necessary. Hyponatremia, mild.   - From impaired free water excretion in the setting of renal insufficiency. - Monitor. Hypertensive Urgency. - Holding Lisinopril and Lasix. - Target SBP of 160-170smmHg today. - On Carvedilol, increase to 12.5mg BID. - Plan to add Nifedipine in am.  - On PRN Hydralazine and Clonidine. UTI.  - On Aztreonam per primary service. Thank you for allowing me to participate in the care of this patient. Please do not hesitate to contact me at [phone] if with questions.     Jolynn Soliman MD  9/22/2020  [facility]

## 2020-09-22 NOTE — PROGRESS NOTES
Patient admitted to room 324 from our ER. Patient oriented to room, call light, bed rails, phone, lights and bathroom. Patient instructed about the schedule of the day including: vital sign frequency, lab draws, possible tests, frequency of MD and staff rounds, daily weights, I &O's and prescribed diet. Yes, bed alarm in place, patient aware of placement and reason. #26 Telemetry box in place, patient aware of placement and reason. Bed locked, in lowest position, side rails up 2/4, call light within reach. Recliner Assessment  Patient is able to demonstrated the ability to move from a reclining position to an upright position within the recliner. 4 Eyes Skin Assessment     The patient is being assess for   Admission    I agree that 2 RN's have performed a thorough Head to Toe Skin Assessment on the patient. ALL assessment sites listed below have been assessed. Areas assessed by both nurses:   [x]   Head, Face, and Ears   [x]   Shoulders, Back, and Chest, Abdomen  [x]   Arms, Elbows, and Hands   [x]   Coccyx, Sacrum, and Ischium  [x]   Legs, Feet, and Heels         Pt has a scar midline on chest, Scabbed areas on scalp scattered. Pt stated he believed it may be shingles but unsure. No other skin issues. **SHARE this note so that the co-signing nurse is able to place an eSignature**    Co-signer eSignature: Electronically signed by Zac Rincon RN on 9/22/20 at 4:28 AM EDT    Does the Patient have Skin Breakdown?   No          Justen Prevention initiated:  Yes   Wound Care Orders initiated:  No      [facility] nurse consulted for Pressure Injury (Stage 3,4, Unstageable, DTI, NWPT, Complex wounds)and New or Established Ostomies:  NA      Primary Nurse eSignature: Electronically signed by Jesi Orr RN on 9/22/20 at 2:32 AM EDT

## 2020-09-22 NOTE — PROGRESS NOTES
Patient chair alarm alarming. Patient stated he needed to use the bathroom. Very upset about the telesitter being in the room. Refusing to use the call light when needing to use the urinal/bathroom.

## 2020-09-22 NOTE — H&P
Hospital Medicine History & Physical      PCP: Ferdinand Steward DO    Date of Admission: 9/21/2020    Date of Service: Pt seen/examined on 9/21/2020 and Admitted to Inpatient with expected LOS greater than two midnights due to medical therapy. Chief Complaint:  Dizziness      History Of Present Illness:       [de-identified] y.o. male  Presents to the ER following onset of dizziness, visual hallucinations. He was recently started on valtrex for possible zoster involving his R scalp. He denies headache, neck pain, n/v. He denies chest pain, palpitations, sob, fever, chills. He denies urinary hesitancy, flank pain, dysuria. Currently he is in no respiratory distress on room air, alert, oriented, appropriately and coherently responsive. Past Medical History:          Diagnosis Date    Atrial fibrillation ([address]     CHF (congestive heart failure) (HCC)     COPD (chronic obstructive pulmonary disease) (HCC)     Elevated PSA     Enlarged prostate     Hypertension     Hyperthyroidism     resolved    Right leg DVT ([address] 2/2/2015       Past Surgical History:          Procedure Laterality Date    CARDIAC SURGERY      mitral valve repair    COLONOSCOPY  06/19/2017    COLONOSCOPY  05/22/2018    polyp removal, sigmoid diverticulosis, rectal bleeding    CYSTOSCOPY  1/20/15    Clot Evacuation    CYSTOSCOPY  2/18/15    HERNIA REPAIR      TURP  2/18/15    CLOT EVACUATION, TRANSURETHRAL RESECTION OF THE PROSTATE       Medications Prior to Admission:      Prior to Admission medications    Medication Sig Start Date End Date Taking?  Authorizing Provider   furosemide (LASIX) 40 MG tablet Take 1 tablet by mouth daily 9/14/20  Yes Mitch Altman,    aspirin 81 MG tablet Take 81 mg by mouth daily   Yes Historical Provider, MD   albuterol sulfate HFA (PROVENTIL HFA) 108 (90 Base) MCG/ACT inhaler Inhale 2 puffs into the lungs every 6 hours as needed for Wheezing 11/6/18  Yes Chely Bowles MD   valACYclovir (VALTREX) 1 g tablet Take 1 tablet by mouth 3 times daily for 7 days 9/19/20 9/26/20  Cristina Rivera MD   fluticasone-umeclidin-vilant (TRELEGY ELLIPTA) 616-74.8-51 MCG/INH AEPB Inhale 1 puff into the lungs daily 9/15/20   [facility], DO   lisinopril (PRINIVIL;ZESTRIL) 5 MG tablet Take 1 tablet by mouth daily 9/14/20   [facility], DO   carvedilol (COREG) 12.5 MG tablet Take 0.5 tablets by mouth 2 times daily (with meals) 9/14/20   [facility], DO   Umeclidinium Bromide 62.5 MCG/INH AEPB Inhale 62.5 mcg into the lungs daily 9/14/20   April Altman, DO   fluticasone-salmeterol (ADVAIR DISKUS) 250-50 MCG/DOSE AEPB Inhale 1 puff into the lungs 2 times daily 9/14/20   April Altman, DO   levalbuterol [place]) 0.63 MG/3ML nebulization Take 3 mLs by nebulization every 6 hours as needed for Wheezing  Patient not taking: Reported on 9/14/2020 11/6/18   Vesta Johnson MD   Spacer/Aero-Holding Chambers (E-Z SPACER) MARIFER 1 Device by Does not apply route daily as needed (prn with Albuterol) 11/6/18   Vesta Johnson MD   isosorbide mononitrate (IMDUR) 30 MG CR tablet Take 30 mg by mouth daily    Historical Provider, MD       Allergies:  Pcn [penicillins]    Social History:         TOBACCO:   reports that he quit smoking about 18 years ago. He quit after 30.00 years of use. He has never used smokeless tobacco.  ETOH:   reports no history of alcohol use. Family History:           History reviewed. No pertinent family history. REVIEW OF SYSTEMS:   Pertinent positives as noted in the HPI. All other systems reviewed and negative. PHYSICAL EXAM PERFORMED:    BP (!) 186/99   Pulse 84   Temp 97 °F (36.1 °C) (Oral)   Resp 18   Ht 5' 8\" (1.727 m)   Wt 165 lb (74.8 kg)   SpO2 94%   BMI 25.09 kg/m²     General appearance:  No apparent distress, appears stated age and cooperative. HEENT:  Normal cephalic, atraumatic without obvious deformity. Pupils equal, round, and reactive to light. Extra ocular muscles intact. Conjunctivae/corneas clear. Neck: Supple, with full range of motion. No jugular venous distention. Trachea midline. Respiratory:  Normal respiratory effort. Clear to auscultation, bilaterally without Rales/Wheezes/Rhonchi. Cardiovascular:  Regular rate and rhythm with normal S1/S2 without murmurs, rubs or gallops. Abdomen: Soft, non-tender, non-distended with normal bowel sounds. Musculoskeletal:  No clubbing, cyanosis or edema bilaterally. Full range of motion without deformity. Skin: Skin color, texture, turgor normal.     Neurologic:   grossly non-focal.  Psychiatric:  Alert and oriented, thought content appropriate, normal insight       Labs:     Recent Labs     09/21/20  1402   WBC 16.3*   HGB 19.6*   HCT 61.3*        Recent Labs     09/21/20  1402   *   K 4.6   CL 96*   CO2 19*   BUN 62*   CREATININE 3.7*   CALCIUM 9.2     Recent Labs     09/21/20  1402   AST 14*   ALT 10   BILITOT 0.8   ALKPHOS 114     Recent Labs     09/21/20  1402   INR 1.14     Recent Labs     09/21/20  1402 09/22/20  0124   TROPONINI 0.02* 0.02*       Urinalysis:      Lab Results   Component Value Date    NITRU Negative 09/21/2020    WBCUA >100 09/21/2020    BACTERIA 4+ 09/21/2020    RBCUA 21-50 09/21/2020    BLOODU LARGE 09/21/2020    SPECGRAV 1.020 09/21/2020    GLUCOSEU Negative 09/21/2020       Radiology:          CT HEAD WO CONTRAST   Final Result   No acute intracranial abnormality. XR CHEST PORTABLE   Final Result   No acute cardiopulmonary disease. Enlargement of the cardiac silhouette, which appears increased compared to   baseline. Cardiomegaly versus pericardial effusion.          US RETROPERITONEAL COMPLETE    (Results Pending)       ASSESSMENT:    Active Hospital Problems    Diagnosis Date Noted    SHALINI (acute kidney injury) ([facility] [address] [N17.9] 09/21/2020         PLAN:    1) HTN urgency  - hold Acie  - prn hydralazine  - serial trop  - likely secondary to aptient not taking coreg prior to ER visit    2) UTI  - Azactam ordered  - follow up cultures    3) SHALINI  - check renal USN  - Nephrology consulted    4) Zoster  - patient denies pain/ burning assco with rash  - no headache / nuchal rigidity / sz activity      DVT Prophylaxis: heparin  Diet: DIET GENERAL; Low Sodium (2 GM)  Code Status: Full Code         Justo Grace MD    Thank you Manuel Perez DO for the opportunity to be involved in this patient's care. If you have any questions or concerns please feel free to contact me at [phone].

## 2020-09-22 NOTE — PROGRESS NOTES
Inpatient Physical Therapy Evaluation and Treatment    Unit: PCU  Date:  9/22/2020  Patient Name:    Facundo Davila  Admitting diagnosis:  SHALINI (acute kidney injury) [facility]) [N17.9]  Admit Date:  9/21/2020  Precautions/Restrictions/WB Status/ Lines/ Wounds/ Oxygen: Fall risk, Bed/chair alarm, Lines -IV, Telemetry, Isolation Precautions: contact and airborne precautions for Herpes Zoster and Telesitter    Treatment Time:  0900 - 0930  Treatment Number:  1   Timed Code Treatment Minutes: 20 minutes  Total Treatment Minutes:  30  minutes    Patient Goals for Therapy: \" To go home \"          Discharge Recommendations: Home PRN assist   DME needs for discharge: Needs Met       Therapy recommendation for EMS Transport: can transport by wheelchair    Therapy recommendations for staff:   Supervision with use of No AD for all transfers and ambulation to/from bathroom  within room    History of Present Illness: [de-identified] y.o. male  Presents to the ER following onset of dizziness, visual hallucinations. He was recently started on valtrex for possible zoster involving his R scalp. He denies headache, neck pain, n/v. He denies chest pain, palpitations, sob, fever, chills. He denies urinary hesitancy, flank pain, dysuria. Currently he is in no respiratory distress on room air, alert, oriented, appropriately and coherently responsive. Home Health S4 Level Recommendation:  NA  AM-PAC Mobility Score            Preadmission Environment    Pt. Lives with spouse, 24/7 Assist Available  and 2 dogs  Home environment:  two story home  Steps to enter first floor: 6 steps to enter and hand rail unilateral, 5 steps outside followed by flat hallways, 3 steps to family room, 1 step to living room, 5 steps to deck and bedroom  Steps to second floor: N/A  Bathroom: tub/shower unit, comfort height toilet and grab bars  Equipment owned:  (wife uses) and [facility]    Preadmission Status:  Pt. Able to drive: Yes  Pt Fully independent with ADLs: Yes  Pt. Required assistance from family for: Independent PTA  Pt. independent for transfers and gait and walked with No Device  History of falls No    Pain   No    Cognition    A&O x4   Able to follow 2 step commands    Subjective  Patient lying supine in bed with no family present. Pt agreeable to this PT eval & tx. Upper Extremity ROM/Strength  Please see OT evaluation. Lower Extremity ROM / Strength   AROM WFL: Yes  ROM limitations: N/A    Strength Assessment (measured on a 0-5 scale):  R LE   Quad   5/5   Ant Tib  5/5   Hamstring 5/5   Iliopsoas 5/5  L LE  Quad   5/5   Ant Tib  5/5   Hamstring 5/5   Iliopsoas 5/5    Lower Extremity Sensation    WFL    Lower Extremity Proprioception:   WFL    Coordination and Tone  WFL    Balance  Sitting:  Normal; Independent  Comments:     Standing: Good ; Supervision  Comments: 8 min    Bed Mobility   Supine to Sit:    Independent  Sit to Supine:   Not Tested  Rolling:   Not Tested  Scooting in sitting: Independent  Scooting in supine:  Independent    Transfer Training     Sit to stand:   Supervision  Stand to sit:   Supervision  Bed to Chair:   Supervision with use of No AD and gait belt    Gait gait completed as indicated below  Distance:      40 ft  Deviations (firm surface/linoleum):  forward flexed posture  Assistive Device Used:    No AD and gait belt  Level of Assist:    SBA  Comment:     Stair Training Patient was able to perform spot marching with one UE support for 10 reps to simulate balance requirements needed for stair ambulation. Stair assessment could not be done using stairs due to isolation precautions. Activity Tolerance   Pt completed therapy session with No adverse symptoms noted w/activity  BP: 159/86    Positioning Needs   Pt up in chair, alarm set, positioned in proper neutral alignment and pressure relief provided.    Call light provided and all needs within reach    Exercises Initiated  all completed bilaterally unless indicated  Ankle Pumps x 10 reps  LAQ x 10 reps    Other  None. Patient/Family Education   Pt educated on role of inpatient PT, POC, importance of continued activity, DC recommendations, safety awareness, transfer techniques, pursed lip breathing, energy conservation, pacing activity and calling for assist with mobility. Assessment  Pt seen for Physical Therapy evaluation in acute care setting. Patient was close to baseline and needed supervision due to presence of IV line and telemonitor. Pt demonstrated decreased Activity tolerance, Balance, Safety and Strength as well as decreased independence with Ambulation, Bed Mobility  and Transfers. Recommending Home PRN assist upon discharge as patient functioning close to baseline level    Goals : To be met in 3 visits:  1). Independent with LE Ex x 10 reps    To be met in 6 visits:  1). Supine to/from sit: N/A  2). Sit to/from stand: Independent  3). Bed to chair: Independent  4). Gait: Ambulate 150 ft.  with  Independent and use of No AD  5). Tolerate B LE exercises 3 sets of 10-15 reps  6). Ascend/descend 6 steps with Modified Independent with use of hand rail unilateral and LRAD (least restrictive assistive device)    Rehabilitation Potential: Good  Strengths for achieving goals include:   PLOF and Pt cooperative   Barriers to achieving goals include:    No Barriers    Plan    To be seen for 1-2 visits  while in acute care setting for therapeutic exercises, bed mobility, transfers, progressive gait training, balance training, and family/patient education. Signature: Ana Thompson MS PT, # [id number]      If patient discharges from this facility prior to next visit, this note will serve as the Discharge Summary.

## 2020-09-22 NOTE — PROGRESS NOTES
Pt resting in bed and ready to go to sleep. Admission assessment complete. Pt offered food and beverage stating he had been in the ER downstairs since 11 AM with nothing to eat or drink. Skin looks good overall. Possible shingles on pts scalp. Pt stated he has been having hallucinations seeing skeletons and they won't go away. Pt stated he thought he took too much medication but was unable to state which medication he took. Bed in lowest position. Call light within reach. All belongings within reach. Will continue to monitor and assess.

## 2020-09-23 VITALS
RESPIRATION RATE: 16 BRPM | OXYGEN SATURATION: 96 % | SYSTOLIC BLOOD PRESSURE: 138 MMHG | HEIGHT: 68 IN | WEIGHT: 162 LBS | TEMPERATURE: 96.4 F | BODY MASS INDEX: 24.55 KG/M2 | HEART RATE: 93 BPM | DIASTOLIC BLOOD PRESSURE: 67 MMHG

## 2020-09-23 LAB
ALBUMIN SERPL-MCNC: 3.1 G/DL (ref 3.1–4.9)
ALPHA-1-GLOBULIN: 0.3 G/DL (ref 0.2–0.4)
ALPHA-2-GLOBULIN: 0.7 G/DL (ref 0.4–1.1)
ANION GAP SERPL CALCULATED.3IONS-SCNC: 11 MMOL/L (ref 3–16)
ANTI-NUCLEAR ANTIBODY (ANA): NEGATIVE
BETA GLOBULIN: 1.1 G/DL (ref 0.9–1.6)
BUN BLDV-MCNC: 59 MG/DL (ref 7–20)
CALCIUM SERPL-MCNC: 8.9 MG/DL (ref 8.3–10.6)
CHLORIDE BLD-SCNC: 103 MMOL/L (ref 99–110)
CO2: 19 MMOL/L (ref 21–32)
CREAT SERPL-MCNC: 3.7 MG/DL (ref 0.8–1.3)
GAMMA GLOBULIN: 1 G/DL (ref 0.6–1.8)
GFR AFRICAN AMERICAN: 19
GFR NON-AFRICAN AMERICAN: 16
GLUCOSE BLD-MCNC: 82 MG/DL (ref 70–99)
HIV AG/AB: NORMAL
HIV ANTIGEN: NORMAL
HIV-1 ANTIBODY: NORMAL
HIV-2 AB: NORMAL
ORGANISM: ABNORMAL
POTASSIUM REFLEX MAGNESIUM: 4.5 MMOL/L (ref 3.5–5.1)
SODIUM BLD-SCNC: 133 MMOL/L (ref 136–145)
SPE/IFE INTERPRETATION: NORMAL
TOTAL PROTEIN: 6.2 G/DL (ref 6.4–8.2)
URINE CULTURE, ROUTINE: ABNORMAL

## 2020-09-23 PROCEDURE — 94761 N-INVAS EAR/PLS OXIMETRY MLT: CPT

## 2020-09-23 PROCEDURE — 80048 BASIC METABOLIC PNL TOTAL CA: CPT

## 2020-09-23 PROCEDURE — 36415 COLL VENOUS BLD VENIPUNCTURE: CPT

## 2020-09-23 PROCEDURE — 6370000000 HC RX 637 (ALT 250 FOR IP): Performed by: INTERNAL MEDICINE

## 2020-09-23 PROCEDURE — 2500000003 HC RX 250 WO HCPCS: Performed by: PHYSICIAN ASSISTANT

## 2020-09-23 PROCEDURE — 2580000003 HC RX 258: Performed by: PHYSICIAN ASSISTANT

## 2020-09-23 PROCEDURE — 6370000000 HC RX 637 (ALT 250 FOR IP): Performed by: PHYSICIAN ASSISTANT

## 2020-09-23 PROCEDURE — 99238 HOSP IP/OBS DSCHRG MGMT 30/<: CPT | Performed by: INTERNAL MEDICINE

## 2020-09-23 RX ORDER — LEVOFLOXACIN 250 MG/1
250 TABLET ORAL DAILY
Qty: 7 TABLET | Refills: 0 | Status: SHIPPED | OUTPATIENT
Start: 2020-09-23 | End: 2020-09-30

## 2020-09-23 RX ORDER — TAMSULOSIN HYDROCHLORIDE 0.4 MG/1
0.4 CAPSULE ORAL DAILY
Status: DISCONTINUED | OUTPATIENT
Start: 2020-09-23 | End: 2020-09-23 | Stop reason: HOSPADM

## 2020-09-23 RX ORDER — TAMSULOSIN HYDROCHLORIDE 0.4 MG/1
0.4 CAPSULE ORAL DAILY
Qty: 30 CAPSULE | Refills: 1 | Status: SHIPPED | OUTPATIENT
Start: 2020-09-23 | End: 2022-02-22 | Stop reason: SDUPTHER

## 2020-09-23 RX ORDER — CARVEDILOL 12.5 MG/1
12.5 TABLET ORAL 2 TIMES DAILY WITH MEALS
Qty: 60 TABLET | Refills: 1 | Status: SHIPPED | OUTPATIENT
Start: 2020-09-23

## 2020-09-23 RX ADMIN — AZTREONAM 250 MG: 1 INJECTION, POWDER, LYOPHILIZED, FOR SOLUTION INTRAMUSCULAR; INTRAVENOUS at 05:58

## 2020-09-23 RX ADMIN — CARVEDILOL 12.5 MG: 6.25 TABLET, FILM COATED ORAL at 09:31

## 2020-09-23 RX ADMIN — TAMSULOSIN HYDROCHLORIDE 0.4 MG: 0.4 CAPSULE ORAL at 09:31

## 2020-09-23 RX ADMIN — ASPIRIN 81 MG CHEWABLE TABLET 81 MG: 81 TABLET CHEWABLE at 09:31

## 2020-09-23 RX ADMIN — Medication 10 ML: at 09:32

## 2020-09-23 RX ADMIN — ISOSORBIDE MONONITRATE 30 MG: 30 TABLET ORAL at 09:31

## 2020-09-23 ASSESSMENT — PAIN SCALES - GENERAL: PAINLEVEL_OUTOF10: 0

## 2020-09-23 NOTE — PROGRESS NOTES
Nephrology Progress Note   [email or web address]      This patient is a [de-identified]year old male whom we are following for SHALINI/CKD. Subjective: The patient was seen and examined. Feels fine. BP is better this morning. Complaining of urinary frequency and not emptying his bladder completely. Wants to go home. Family History: No family at bedside  ROS: No nausea or vomiting      Vitals:  /67   Pulse 93   Temp 96.4 °F (35.8 °C) (Oral)   Resp 16   Ht 5' 8\" (1.727 m)   Wt 162 lb (73.5 kg)   SpO2 96%   BMI 24.63 kg/m²   I/O last 3 completed shifts: In: 1867.5 [P.O.:480; I.V.:1287.5; IV Piggyback:100]  Out: 1150 [Urine:1150]  I/O this shift:  In: 120 [P.O.:120]  Out: -     Physical Exam:  Physical Exam  Vitals signs reviewed. Constitutional:       General: He is not in acute distress. Appearance: Normal appearance. HENT:      Head: Normocephalic and atraumatic. Mouth/Throat:      Mouth: Mucous membranes are moist.   Eyes:      General: No scleral icterus. Conjunctiva/sclera: Conjunctivae normal.   Cardiovascular:      Rate and Rhythm: Normal rate. Heart sounds: No friction rub. Pulmonary:      Effort: Pulmonary effort is normal. No respiratory distress. Breath sounds: No wheezing or rales. Abdominal:      General: Bowel sounds are normal. There is no distension. Tenderness: There is no abdominal tenderness. Musculoskeletal:      Right lower leg: No edema. Left lower leg: No edema. Neurological:      Mental Status: He is alert.            Medications:   tamsulosin  0.4 mg Oral Daily    aspirin  81 mg Oral Daily    isosorbide mononitrate  30 mg Oral Daily    sodium chloride flush  10 mL Intravenous 2 times per day    heparin (porcine)  5,000 Units Subcutaneous Q8H    aztreonam  250 mg Intravenous Q8H    budesonide-formoterol  2 puff Inhalation BID    carvedilol  12.5 mg Oral BID WC         Labs:  Recent Labs     09/21/20  1402 09/22/20  0646   WBC 16.3* 17.7*   HGB

## 2020-09-23 NOTE — PLAN OF CARE
Problem: Falls - Risk of:  Goal: Will remain free from falls  Description: Will remain free from falls  9/22/2020 2212 by Katharine Blount RN  Outcome: Ongoing  9/22/2020 2028 by Maurice Mendoza RN  Note: Patient free of falls this shift, now more cooperative w/ care.   Goal: Absence of physical injury  Description: Absence of physical injury  Outcome: Ongoing

## 2020-09-23 NOTE — PLAN OF CARE
Problem: Falls - Risk of:  Goal: Will remain free from falls  Description: Will remain free from falls  Note: Patient free of falls this shift, now more cooperative w/ care.

## 2020-09-23 NOTE — PROGRESS NOTES
Pt resting in bed this evening very upset that camera was placed in room. Reason explained to pt why camera was placed in the room this am. Pt understood reason. Those reasons reassessed by nurse and determined that tele-sitter is not needed at this time. Shift assessment complete and all meds given per MAR. Pt A&O x 4, VSS, Pts gait is steady and tolerated very well. Beverage and snack offered. Bed in lowest position and call light within reach. All belongings within reach. Will continue to monitor and assess closely. Pt informed to use call light for any further needs.

## 2020-09-23 NOTE — PROGRESS NOTES
Patient not happy w/ safety measures (bed alarm and calling for assistance when needing to use bathroom.) non compliant, stating he will just shut the alarms off. Patient educated on importance of notifying staff when needing to be OOB. Patient still refusing. TS placed in patient room for patient safety. Explanation given.

## 2020-09-23 NOTE — PROGRESS NOTES
Handoff report given to [facility], RN. Care transferred. Pt is stable at this time.

## 2020-09-23 NOTE — DISCHARGE SUMMARY
Name:  Leroy Covarrubias  Room:  /8598-10  MRN:    [id number]    Discharge Summary      This discharge summary is in conjunction with a complete physical exam done on the day of discharge. Discharging Physician: Dr. Laci Garnica: 9/21/2020  Discharge:   9/24/2020    HPI taken from admission H&P:    [de-identified] y.o. male  Presents to the ER following onset of dizziness, visual hallucinations. He was recently started on valtrex for possible zoster involving his R scalp. He denies headache, neck pain, n/v. He denies chest pain, palpitations, sob, fever, chills. He denies urinary hesitancy, flank pain, dysuria. Currently he is in no respiratory distress on room air, alert, oriented, appropriately and coherently responsive.     Diagnoses this Admission and Hospital Course   HTN Urgency--resolved   - BP in /133  - Was given home medications with minimal improvement   - PRN clonidine and hydralazine  - Coreg increased to 12.5 mg BID-->d/c home on the same   - in the setting of SHALINI as well  - Troponin mildly elevated   - No CP  - Discontinue Lisinopril and Lasix on discharge      SHALINI r/p   CKD Stage IV  - Baseline creatinine 1.7 on most recent labs from 5/2018  - Creatinine on admission 3.7-->3.6-->3.7  - Renal US: shows renal atrophy with increased renal cortical echogenicity  - HOLD home Lasix, Lisinopril--discontinue on discharge   - Continue IVF and monitor   - Nephrology consult   - Add Flomax     Hyponatremia  - Mild, 130-->133  - IVF and continue to monitor   - Hold home Lasix       Cardiomegaly vs pericardial effusion   Chronic sCHF  - Last echo in 2018 with EF 20-25%  - Was following with [facility] cardiology  - CXR with worsened cardiomegaly vs pericardial effusion   - patient will follow up with cardiology OP  - Holding home diuretic 2/2 possible SHALINI   - Appears well compensated      Elevated troponin   - Troponin 0.02-->0.02-->0.03  - EKG with atrial fibrillation with bifasicular block with RBBB new compared to prior   - CXR with increased cardiomegaly vs pericardial effusion  - Continue tele monitor   - elevated troponin may be 2/2 SHALINI   - Denies any CP or SOB     UTI  - UA with + LE and  >100 EBC  - Urine Cx: + Klebsiella, d/c home on Levaquin   - Continue Aztreonam, hx of SJS with PCNs      Dizziness, VH--resolved  - likely 2/2 UTI and possible SHALINI   - Continue Abx   - Supportive care and monitor   - CT without acute findings      Varicella Zoster   - Dx prior to admission on scalp   - Was started on Valtrex   - started having hallucinations after starting the medication, held on admission   - PRN symptom control      Atrial Fibrillation   - Chronic   - n baby ASA only   - Rate controlled, continue home BB      Leukocytosis   - suspect 2/2 UTI  - On Abx  - Monitor      Polycythemia   - new since prior labs   - in setting of possible SHALINI  - Hydrate and monitor   - OP follow up with repeat labs      Hyperthyroidism    - TSH WNL  - Not on home medications      COPD  - No AE   - Continue home medications        Procedures (Please Review Full Report for Details)  Renal US    Consults    Nephrology     Physical Exam at Discharge:    /67   Pulse 93   Temp 96.4 °F (35.8 °C) (Oral)   Resp 16   Ht 5' 8\" (1.727 m)   Wt 162 lb (73.5 kg)   SpO2 96%   BMI 24.63 kg/m²     en: Elderly male, No distress. Alert. Eyes: No conjunctival injection. ENT: No discharge. Pharynx clear. Neck:  Trachea midline. Resp: No accessory muscle use. No crackles. No wheezes. No rhonchi. CV: Regular rate. Regular rhythm. No murmur. No rub. No edema. Well healed midline scar   Capillary Refill: Brisk,< 3 seconds   Peripheral Pulses: +2 palpable, equal bilaterally   GI: Non-tender. Non-distended. Normal bowel sounds. Skin: Warm and dry. Scabbed area to R posterior scalp, no surrounding erythema, non-tender  M/S: No cyanosis. No joint deformity. No clubbing. Neuro: Awake. Grossly nonfocal    Psych: Oriented x 3. No anxiety or agitation. CBC:   Recent Labs     09/22/20  0646   WBC 17.7*   HGB 19.3*   HCT 60.8*   MCV 83.3        BMP:   Recent Labs     09/22/20  0646 09/23/20  0457   * 133*   K 4.3 4.5    103   CO2 18* 19*   BUN 61* 59*   CREATININE 3.6* 3.7*     LIVER PROFILE:   No results for input(s): AST, ALT, LIPASE, BILIDIR, BILITOT, ALKPHOS in the last 72 hours. Invalid input(s): AMYLASE,  ALB  PT/INR:   No results for input(s): PROTIME, INR in the last 72 hours. APTT:   No results for input(s): APTT in the last 72 hours. UA:  Recent Labs     09/21/20  1506   COLORU Yellow   PHUR 5.5   WBCUA >100*   RBCUA 21-50*   BACTERIA 4+*   CLARITYU CLOUDY*   SPECGRAV 1.020   LEUKOCYTESUR MODERATE*   UROBILINOGEN 0.2   BILIRUBINUR Negative   BLOODU LARGE*   GLUCOSEU Negative     CULTURES  Urine Cx: Klebsiella penumoniae  Klebsiella pneumoniae (1)     Antibiotic  Interpretation  ABIEL  Status     ampicillin  Resistant  >=32  mcg/mL      ceFAZolin  Sensitive  <=4  mcg/mL       NOTE: Cefazolin should only be used for uncomplicated UTI         for E.coli or Klebsiella pneumoniae. cefepime  Sensitive  <=0.12  mcg/mL      cefTRIAXone  Sensitive  <=0.25  mcg/mL      ciprofloxacin  Sensitive  <=0.25  mcg/mL      ertapenem  Sensitive  <=0.12  mcg/mL      gentamicin  Sensitive  <=1  mcg/mL      levofloxacin  Sensitive  <=0.12  mcg/mL      nitrofurantoin  Resistant  128  mcg/mL      piperacillin-tazobactam  Sensitive  <=4  mcg/mL      trimethoprim-sulfamethoxazole  Sensitive  <=20  mcg/mL            RADIOLOGY  US RENAL COMPLETE   Final Result   Renal atrophy with increased renal cortical echogenicity. This is compatible   with medical renal disease but nonspecific. No hydronephrosis. CT HEAD WO CONTRAST   Final Result   No acute intracranial abnormality. XR CHEST PORTABLE   Final Result   No acute cardiopulmonary disease.       Enlargement of the cardiac silhouette, which appears increased compared to   baseline. Cardiomegaly versus pericardial effusion. Discharge Medications     Medication List      START taking these medications    levoFLOXacin 250 MG tablet  Commonly known as:  Levaquin  Take 1 tablet by mouth daily for 7 days  Notes to patient:  Levaquin  Use: Antibiotic  Side effects: Nausea/Vomiting, Headache,   Dizziness, Tendoniitis, Tendon rupture     tamsulosin 0.4 MG capsule  Commonly known as:  FLOMAX  Take 1 capsule by mouth daily  Notes to patient:  Tamsulosin (Flomax ®)  Use: treat an enlarged prostate or urinary retention. Side effects:  feeling dizzy, headache or stuffy nose.         CHANGE how you take these medications    carvedilol 12.5 MG tablet  Commonly known as:  COREG  Take 1 tablet by mouth 2 times daily (with meals)  What changed:  how much to take  Notes to patient:  Beta Blockers   Use: treat heart failure, prevent future heart attacks, lower blood pressure and heart rate, treat chest pain  Side effects: Dizziness, fatigue and diarrhea        CONTINUE taking these medications    albuterol sulfate  (90 Base) MCG/ACT inhaler  Commonly known as:  Proventil HFA  Inhale 2 puffs into the lungs every 6 hours as needed for Wheezing     aspirin 81 MG tablet     E-Z Spacer Zayda  1 Device by Does not apply route daily as needed (prn with Albuterol)     fluticasone-salmeterol 250-50 MCG/DOSE Aepb  Commonly known as:  Advair Diskus  Inhale 1 puff into the lungs 2 times daily     Imdur 30 MG extended release tablet  Generic drug:  isosorbide mononitrate     levalbuterol 0.63 MG/3ML nebulization  Commonly known as:  Xopenex  Take 3 mLs by nebulization every 6 hours as needed for Wheezing     Trelegy Ellipta 100-62.5-25 MCG/INH Aepb  Generic drug:  fluticasone-umeclidin-vilant  Inhale 1 puff into the lungs daily     Umeclidinium Bromide 62.5 MCG/INH Aepb  Inhale 62.5 mcg into the lungs daily        STOP taking these medications    furosemide 40 MG tablet  Commonly known as:  LASIX     lisinopril 5 MG tablet  Commonly known as:  PRINIVIL;ZESTRIL     valACYclovir 1 g tablet  Commonly known as:  VALTREX           Where to Get Your Medications      These medications were sent to [address] [place] [address], [address] [place] [address],[address]  [address]    Phone:  [phone]   · carvedilol 12.5 MG tablet  · levoFLOXacin 250 MG tablet  · tamsulosin 0.4 MG capsule       Discharged in stable condition to home    Follow Up:   Follow up with PCP in 1 week, nephrology OP      Alea Goodson 9/25/2020 4:26 PM

## 2020-09-23 NOTE — PROGRESS NOTES
Tele-sitter removed from pts room. Pt is A&O x 4, no longer confused, not pulling at any lines. No need for tele-sitter at this time. Pt is very upset that tele-sitter is in room. Pt is steady on feet and able to walk from bed to bathroom with no issues. Pt refused bed alarm and wants freedom to move from bed to chair. Bed alarm removed per pts request. Pt stated he isn't going to receive his healthcare from here when he gets sick next time. Situation deescalated will continue to monitor closely and assess.

## 2020-09-23 NOTE — CARE COORDINATION
DISCHARGE ORDER  Date/Time 2020 10:09 AM  Completed by: Annette Jaffe, Case Management    Patient Name: Radha Motta      : 1940  Admitting Diagnosis: SHALINI (acute kidney injury) ([address] [N17.9]      Admit order Date and Status:2020  (verify MD's last order for status of admission)      Noted discharge order. If applicable PT/OT recommendation at Discharge: Home PRN assist   DME recommendation by PT/OT:Needs Met  Confirmed discharge plan: Yes  with whom___pt____________  If pt confirmed DC plan does family need to be contacted by CM No if yes who______  Discharge Plan: Chart reviewed, TC to pt in his room and he declines [address] at this time. Pt is returning home with spouse. No dc needs voiced or identified. Reviewed chart. Role of discharge planner explained and patient verbalized understanding. Discharge order is noted. Has Home O2 in place on admit:  No    Pt is being d/c'd to home today. Pt's O2 sats are 96% on RA. Discharge timeout done with Maria Teresa Ask. All discharge needs and concerns addressed.

## 2020-09-23 NOTE — PROGRESS NOTES
Pt refusing telemetry. Pt informed that if he feels abnormal or not usual self to notify nurse ASAP. Will continue to monitor and assess.

## 2020-09-23 NOTE — PROGRESS NOTES
Patient discharge instructions reviewed w/ patient and patient's daughter  -  Questions answered as verbalized

## 2020-09-24 LAB — HEPATITIS B CORE TOTAL ANTIBODY: NEGATIVE

## 2020-09-25 LAB
KAPPA, FREE LIGHT CHAINS, SERUM: 90.57 MG/L (ref 3.3–19.4)
KAPPA/LAMBDA RATIO: 2.33 (ref 0.26–1.65)
KAPPA/LAMBDA TEST COMMENT: ABNORMAL
LAMBDA, FREE LIGHT CHAINS, SERUM: 38.84 MG/L (ref 5.71–26.3)

## 2020-10-16 ENCOUNTER — TELEPHONE (OUTPATIENT)
Dept: FAMILY MEDICINE CLINIC | Age: 80
End: 2020-10-16

## 2020-10-16 ENCOUNTER — HOSPITAL ENCOUNTER (EMERGENCY)
Age: 80
Discharge: HOME | End: 2020-10-16
Attending: EMERGENCY MEDICINE
Payer: MEDICARE

## 2020-10-16 ENCOUNTER — HOSPITAL ENCOUNTER (EMERGENCY)
Age: 80
Discharge: LEFT BEFORE DISCHARGE | End: 2020-10-16
Payer: MEDICARE

## 2020-10-16 ENCOUNTER — OFFICE VISIT (OUTPATIENT)
Dept: ENT CLINIC | Age: 80
End: 2020-10-16
Payer: MEDICARE

## 2020-10-16 VITALS
WEIGHT: 165 LBS | DIASTOLIC BLOOD PRESSURE: 88 MMHG | SYSTOLIC BLOOD PRESSURE: 136 MMHG | OXYGEN SATURATION: 100 % | BODY MASS INDEX: 23.62 KG/M2 | TEMPERATURE: 98.6 F | RESPIRATION RATE: 18 BRPM | HEART RATE: 78 BPM | HEIGHT: 70 IN

## 2020-10-16 VITALS
WEIGHT: 165.4 LBS | TEMPERATURE: 97.3 F | SYSTOLIC BLOOD PRESSURE: 205 MMHG | DIASTOLIC BLOOD PRESSURE: 118 MMHG | HEART RATE: 90 BPM | BODY MASS INDEX: 23.68 KG/M2 | RESPIRATION RATE: 18 BRPM | HEIGHT: 70 IN

## 2020-10-16 DIAGNOSIS — N18.9 CHRONIC KIDNEY DISEASE, UNSPECIFIED CKD STAGE: Primary | ICD-10-CM

## 2020-10-16 PROCEDURE — 1111F DSCHRG MED/CURRENT MED MERGE: CPT | Performed by: STUDENT IN AN ORGANIZED HEALTH CARE EDUCATION/TRAINING PROGRAM

## 2020-10-16 PROCEDURE — 1036F TOBACCO NON-USER: CPT | Performed by: STUDENT IN AN ORGANIZED HEALTH CARE EDUCATION/TRAINING PROGRAM

## 2020-10-16 PROCEDURE — 99282 EMERGENCY DEPT VISIT SF MDM: CPT

## 2020-10-16 PROCEDURE — 1123F ACP DISCUSS/DSCN MKR DOCD: CPT | Performed by: STUDENT IN AN ORGANIZED HEALTH CARE EDUCATION/TRAINING PROGRAM

## 2020-10-16 PROCEDURE — G8484 FLU IMMUNIZE NO ADMIN: HCPCS | Performed by: STUDENT IN AN ORGANIZED HEALTH CARE EDUCATION/TRAINING PROGRAM

## 2020-10-16 PROCEDURE — 96374 THER/PROPH/DIAG INJ IV PUSH: CPT

## 2020-10-16 PROCEDURE — 2500000003 HC RX 250 WO HCPCS: Performed by: EMERGENCY MEDICINE

## 2020-10-16 PROCEDURE — G8420 CALC BMI NORM PARAMETERS: HCPCS | Performed by: STUDENT IN AN ORGANIZED HEALTH CARE EDUCATION/TRAINING PROGRAM

## 2020-10-16 PROCEDURE — G8427 DOCREV CUR MEDS BY ELIG CLIN: HCPCS | Performed by: STUDENT IN AN ORGANIZED HEALTH CARE EDUCATION/TRAINING PROGRAM

## 2020-10-16 PROCEDURE — 99203 OFFICE O/P NEW LOW 30 MIN: CPT | Performed by: STUDENT IN AN ORGANIZED HEALTH CARE EDUCATION/TRAINING PROGRAM

## 2020-10-16 PROCEDURE — 4040F PNEUMOC VAC/ADMIN/RCVD: CPT | Performed by: STUDENT IN AN ORGANIZED HEALTH CARE EDUCATION/TRAINING PROGRAM

## 2020-10-16 RX ORDER — TRANEXAMIC ACID 100 MG/ML
1000 INJECTION, SOLUTION INTRAVENOUS ONCE
Status: COMPLETED | OUTPATIENT
Start: 2020-10-16 | End: 2020-10-16

## 2020-10-16 RX ADMIN — TRANEXAMIC ACID 1000 MG: 100 INJECTION, SOLUTION INTRAVENOUS at 03:45

## 2020-10-16 ASSESSMENT — ENCOUNTER SYMPTOMS
RHINORRHEA: 0
COUGH: 0
EYE PAIN: 0
SHORTNESS OF BREATH: 0
NAUSEA: 0
VOMITING: 0

## 2020-10-16 NOTE — ED NOTES
MD to bedside TXA instilled in right nostril where a polyp is located.      Steve Renae RN  10/16/20 1032

## 2020-10-16 NOTE — ED PROVIDER NOTES
CHIEF COMPLAINT  Epistaxis (been bleeding since 10 pm. not on blood thinners, takes a daily asa. hasn't taken it in 3 days)      HISTORY OF PRESENT ILLNESS  Azul Schaefer is a [de-identified] y.o. male who presents to the ED with report of right sided nosebleed this been going on for about the past 5 to 6 hours. He was at [facility] but states that he waited there and was not seen. He left and came here. He does take a daily aspirin but has not taken it in 3 days. He states that he also thinks he has a \"polyp\" in the right side of his nose on his septum. He denies any digital trauma. Does not have a history of nosebleeds like this in the past.  He does have history of A. fib and CHF and COPD as well. He denies any shortness of breath or any headache or dizziness or LOC or chest pain or abdominal pain or nausea or vomiting. He does state he has been spitting up some blood clots from blood draining down the back of his throat. No other complaints, modifying factors or associated symptoms. I have reviewed the following from the nursing documentation. Past Medical History:   Diagnosis Date    Atrial fibrillation ([address]     CHF (congestive heart failure) (HCC)     COPD (chronic obstructive pulmonary disease) (HCC)     Elevated PSA     Enlarged prostate     Hypertension     Hyperthyroidism     resolved    Right leg DVT ([address] 2/2/2015     Past Surgical History:   Procedure Laterality Date    CARDIAC SURGERY      mitral valve repair    COLONOSCOPY  06/19/2017    COLONOSCOPY  05/22/2018    polyp removal, sigmoid diverticulosis, rectal bleeding    CYSTOSCOPY  1/20/15    Clot Evacuation    CYSTOSCOPY  2/18/15    HERNIA REPAIR      TURP  2/18/15    CLOT EVACUATION, TRANSURETHRAL RESECTION OF THE PROSTATE     No family history on file.   Social History     Socioeconomic History    Marital status:      Spouse name: Not on file    Number of children: Not on file    Years of education: Not on file    Highest education level: Not on file   Occupational History    Not on file   Social Needs    Financial resource strain: Not on file    Food insecurity     Worry: Not on file     Inability: Not on file    Transportation needs     Medical: Not on file     Non-medical: Not on file   Tobacco Use    Smoking status: Former Smoker     Years: 30.00     Last attempt to quit: 2002     Years since quittin.8    Smokeless tobacco: Never Used    Tobacco comment: 2 pipes per day   Substance and Sexual Activity    Alcohol use: No    Drug use: No    Sexual activity: Yes     Partners: Female   Lifestyle    Physical activity     Days per week: Not on file     Minutes per session: Not on file    Stress: Not on file   Relationships    Social connections     Talks on phone: Not on file     Gets together: Not on file     Attends Nondenominational service: Not on file     Active member of club or organization: Not on file     Attends meetings of clubs or organizations: Not on file     Relationship status: Not on file    Intimate partner violence     Fear of current or ex partner: Not on file     Emotionally abused: Not on file     Physically abused: Not on file     Forced sexual activity: Not on file   Other Topics Concern    Not on file   Social History Narrative    Not on file     No current facility-administered medications for this encounter.       Current Outpatient Medications   Medication Sig Dispense Refill    carvedilol (COREG) 12.5 MG tablet Take 1 tablet by mouth 2 times daily (with meals) 60 tablet 1    tamsulosin (FLOMAX) 0.4 MG capsule Take 1 capsule by mouth daily 30 capsule 1    fluticasone-umeclidin-vilant (TRELEGY ELLIPTA) 100-62.5-25 MCG/INH AEPB Inhale 1 puff into the lungs daily 1 each 0    Umeclidinium Bromide 62.5 MCG/INH AEPB Inhale 62.5 mcg into the lungs daily 30 each 1    fluticasone-salmeterol (ADVAIR DISKUS) 250-50 MCG/DOSE AEPB Inhale 1 puff into the lungs 2 times daily 1 each 5  aspirin 81 MG tablet Take 81 mg by mouth daily      levalbuterol (XOPENEX) 0.63 MG/3ML nebulization Take 3 mLs by nebulization every 6 hours as needed for Wheezing (Patient not taking: Reported on 9/14/2020) 125 mL 5    albuterol sulfate HFA (PROVENTIL HFA) 108 (90 Base) MCG/ACT inhaler Inhale 2 puffs into the lungs every 6 hours as needed for Wheezing 1 Inhaler 5    Spacer/Aero-Holding Chambers (E-Z SPACER) MARIFER 1 Device by Does not apply route daily as needed (prn with Albuterol) 1 Device 0    isosorbide mononitrate (IMDUR) 30 MG CR tablet Take 30 mg by mouth daily       Allergies   Allergen Reactions    Pcn [Penicillins] Rash     Skin peeling Alroy Rubins Michel reaction       REVIEW OF SYSTEMS  10 systems reviewed, pertinent positives per HPI otherwise noted to be negative. PHYSICAL EXAM  There were no vitals taken for this visit. GENERAL APPEARANCE: Awake and alert. Cooperative. No acute distress. HEAD: Normocephalic. Atraumatic. EYES: PERRL. EOM's grossly intact. ENT: Mucous membranes are moist.  Inspection of nares show no bleeding from the left nare and it appears clear but inspection of the right nare does show a steady stream of bright red blood with a noted very small brisk bleed from the lesion that appears to be his septum. Does appear to be some bright red blood draining down his posterior oropharynx. NECK: Supple. HEART: RRR. CHEST/LUNGS: Chest atraumatic, nontender, respirations unlabored. CTAB. Good air exchange. Speaking comfortably in full sentences. BACK: No midline spinal tenderness or step-off. ABDOMEN: Soft. Non-distended. Non-tender. No guarding or rebound. Normal bowel sounds. EXTREMITIES: No peripheral edema. Moves all extremities equally. All extremities neurovascularly intact. RECTAL/: Deferred  SKIN: Warm and dry. No acute rashes. NEUROLOGICAL: Alert and oriented x 4. CN 2-12 intact, No gross facial drooping. Strength 5/5, sensation intact.  Normal coordination. Gait normal.   PSYCHIATRIC: Normal mood and affect. PROCEDURES  1. ELECTROCAUTERY (for R nostril epistaxis)  -Consent,Risks, and benefits: risks, benefits and alternatives were discussed. Verbal consent obtained from patient. -: Andres Torres DO  -- Since it did appear that the bleeding was coming from what may have been a polyp or other type of lesion from the septum on the right side I did attempt to do electrocautery to get this bleeding to stop which briefly after a couple of seconds of what appeared to stop the bleeding but then appeared to continue bleeding just as bad as before. I attempted electrocautery 3 times but then I did not want attempted anymore as I was afraid that it appeared that some of the tissue was being destroyed away with electrocautery causing continued bleeding.  -Attempted x3  -Post procedure assessment: Unsuccessful hemostasis      2. EPISTAXIS PACKING (for continued bleeding from R nostril not relieved with direct pressure)  Consent,Risks, and benefits: risks, benefits and alternatives were discussed. Verbal consent obtained from patient. : Andres Torres DO  Treatment site: RIGHT nostril  Repair method: 7.5cm Ant-Post nasal balloon soaked with 10ml of Tranexamic Acid  Post-procedure assessment: bleeding stopped, ambulated w/o cont bleeding, pt had no more draining of blood in back of throat  Patient tolerance: Patient tolerated the procedure well with no immediate complications         CRITICAL CARE TIME ATTESTATION (45 minutes):  Due to the high probability of imminent or life-threatening deterioration this patient required my direct attention, interventions and personal management. I personally provided 45 minutes of critical care time exclusive of time spent on separate billable procedures.   Time includes review and interpretation of laboratory data, review and interpretation of radiology results, discussions with consultants as applicable while monitoring for potential decompensation. Interventions were otherwise performed as documented above. ED COURSE/MDM  Patient seen and evaluated. I did try to stop the bleeding by packing the right nostril with gauze that was soaked with 10 mL of tranexamic acid. Upon removal of the gauze after about 20 minutes it did appear that the bleeding it stopped but approximately 1 minute after the gauze is removed we had the patient lean forward and he immediately started bleeding again again also having drainage down the back of his throat. Since it did appear that the bleeding was coming from what may have been a polyp or other type of lesion from the septum on the right side I did attempt to do electrocautery to get this bleeding to stop which briefly after a couple of seconds of what appeared to stop the bleeding but then appeared to continue bleeding just as bad as before. I attempted electrocautery 3 times but then I did not want attempted anymore as I was afraid that it appeared that some of the tissue was being destroyed away with electrocautery causing continued bleeding. As above I did end up soaking a 7.5 cm anterior-posterior nasal packing balloon and did successfully inserted into the right nostril and inflated until the balloon was firm achieving very good hemostasis. Patient also said he had no more drainage of blood on the back of his throat and he tolerated the procedure well. The patient outpatient ENT follow-up with Dr. Alfreda Torres. I did also give him strict return precautions to come back if he had any difficulty with bleeding continuing or any problems breathing or swallowing. I also said he was free to come back to the ER for any other concerns related to his a nasal packing or any other medical concerns in general that he could not see his primary care doctor for. All diagnostic tests reviewed and results discussed with patient. Plan of care discussed with patient.  Patient in agreement with plan. CLINICAL IMPRESSION  1. Epistaxis        Blood pressure 136/88, pulse 78, temperature 98.6 °F (37 °C), temperature source Oral, resp. rate 18, height 5' 10\" (1.778 m), weight 165 lb (74.8 kg), SpO2 100 %. DISPOSITION  Azul Schaefer was discharged to home in stable condition. This chart was generated in part by using Dragon Dictation system and may contain errors related to that system including errors in grammar, punctuation, and spelling, as well as words and phrases that may be inappropriate. When dictating, effort is made to correct spelling/grammar errors. If there are any questions or concerns please feel free to contact the dictating provider for clarification.      Skyla Bautista DO  ATTENDING, [facility], DO  10/19/20 7255

## 2020-10-16 NOTE — PROGRESS NOTES
Leda      Patient Name: Maggie Flower  Medical Record Number:  [id number]  Primary Care Physician:  Per Nelson DO  Date of Consultation: 10/16/2020    Chief Complaint:   Chief Complaint   Patient presents with    Epistaxis     Patient states that he spent the whole night in [facility] ED. States he has been on ASA, but has not taken it for several days now. States that the bleeding started two nights ago, and he went to the ED yesterday. HISTORY OF PRESENT ILLNESS  Crow Renae is a(n) [de-identified] y.o. male who presents with a nosebleed. He was seen in the ER overnight for right-sided nosebleed. Cauterization attempts were tried but ultimately he was packed. His blood pressure has not been well controlled and in the office today it was 205/120s. Last evening it was also similarly high. His daughter is with him today and is wondering if he should be taking more blood pressure medicine. He has never had nosebleeds like this before. He is not currently bleeding at this point in time as the nasal packing is working.     Patient Active Problem List   Diagnosis    Hyperthyroidism    Permanent atrial fibrillation ([address]    Essential hypertension    Prostate cancer ([address]    S/P mitral valve repair    Hx R leg DVT    BPH (benign prostatic hyperplasia)    COPD (chronic obstructive pulmonary disease) (HCC)    Chronic systolic CHF (EF 61-71%)    Polycythemia rubra vera (HCC)    JESE-2 gene mutation    Hematochezia    CKD (chronic kidney disease) stage 3, GFR 30-59 ml/min ([address]    Hypertensive urgency    Former smoker    Diverticulosis    SHALINI (acute kidney injury) ([address]    Dizziness    Bacterial urinary tract infection    Visual hallucinations    Acute kidney injury superimposed on chronic kidney disease ([address]    Chronic a-fib ([address]     Past Surgical History:   Procedure Laterality Date    CARDIAC SURGERY      mitral valve repair  COLONOSCOPY  2017    COLONOSCOPY  2018    polyp removal, sigmoid diverticulosis, rectal bleeding    CYSTOSCOPY  1/20/15    Clot Evacuation    CYSTOSCOPY  2/18/15    HERNIA REPAIR      TURP  2/18/15    CLOT EVACUATION, TRANSURETHRAL RESECTION OF THE PROSTATE     History reviewed. No pertinent family history. Social History     Socioeconomic History    Marital status:      Spouse name: Not on file    Number of children: Not on file    Years of education: Not on file    Highest education level: Not on file   Occupational History    Not on file   Social Needs    Financial resource strain: Not on file    Food insecurity     Worry: Not on file     Inability: Not on file    Transportation needs     Medical: Not on file     Non-medical: Not on file   Tobacco Use    Smoking status: Former Smoker     Years: 30.00     Last attempt to quit: 2002     Years since quittin.8    Smokeless tobacco: Never Used    Tobacco comment: 2 pipes per day   Substance and Sexual Activity    Alcohol use: No    Drug use: No    Sexual activity: Yes     Partners: Female   Lifestyle    Physical activity     Days per week: Not on file     Minutes per session: Not on file    Stress: Not on file   Relationships    Social connections     Talks on phone: Not on file     Gets together: Not on file     Attends Latter day service: Not on file     Active member of club or organization: Not on file     Attends meetings of clubs or organizations: Not on file     Relationship status: Not on file    Intimate partner violence     Fear of current or ex partner: Not on file     Emotionally abused: Not on file     Physically abused: Not on file     Forced sexual activity: Not on file   Other Topics Concern    Not on file   Social History Narrative    Not on file       DRUG/FOOD ALLERGIES: Pcn [penicillins]    [address]  Prior to Admission medications    Medication Sig Start Date End Date Taking? Authorizing Provider   carvedilol (COREG) 12.5 MG tablet Take 1 tablet by mouth 2 times daily (with meals) 9/23/20  Yes TYLOR Clayton   tamsulosin (FLOMAX) 0.4 MG capsule Take 1 capsule by mouth daily 9/23/20  Yes TYLOR Clayton   fluticasone-umeclidin-vilant (TRELEGY ELLIPTA) 505-07.4-56 MCG/INH AEPB Inhale 1 puff into the lungs daily 9/15/20  Yes Rebecca Area DO Anupama   albuterol sulfate HFA (PROVENTIL HFA) 108 (90 Base) MCG/ACT inhaler Inhale 2 puffs into the lungs every 6 hours as needed for Wheezing 11/6/18  Yes Cesar Butcher MD   Umeclidinium Bromide 62.5 MCG/INH AEPB Inhale 62.5 mcg into the lungs daily  Patient not taking: Reported on 10/16/2020 9/14/20   Reebcca Kash Altman DO   fluticasone-salmeterol (ADVAIR DISKUS) 250-50 MCG/DOSE AEPB Inhale 1 puff into the lungs 2 times daily  Patient not taking: Reported on 10/16/2020 9/14/20   Rebeccamigel Altman DO   aspirin 81 MG tablet Take 81 mg by mouth daily    Historical Provider, MD   levalbuterol Alaxel Pavan) 0.63 MG/3ML nebulization Take 3 mLs by nebulization every 6 hours as needed for Wheezing  Patient not taking: Reported on 9/14/2020 11/6/18   Cesar Butcher MD   Spacer/Aero-Holding Chambers (E-Z SPACER) MARIFER 1 Device by Does not apply route daily as needed (prn with Albuterol) 11/6/18   Cesar Butcher MD   isosorbide mononitrate (IMDUR) 30 MG CR tablet Take 30 mg by mouth daily    Historical Provider, MD       REVIEW OF SYSTEMS  The following systems were reviewed and revealed the following in addition to any already discussed in the HPI:    Review of Systems   Constitutional: Negative for fatigue and fever. HENT: Positive for nosebleeds. Negative for congestion, ear pain, postnasal drip, rhinorrhea and sneezing. Eyes: Negative for pain and visual disturbance. Respiratory: Negative for cough and shortness of breath. Cardiovascular: Negative for chest pain. Gastrointestinal: Negative for nausea and vomiting. Endocrine: Negative. making:  Independent review of images  Review / order clinical lab tests  Review / order radiology tests  Decision to obtain old records

## 2021-07-01 ENCOUNTER — HOSPITAL ENCOUNTER (INPATIENT)
Age: 81
LOS: 2 days | Discharge: HOME | DRG: 378 | End: 2021-07-03
Attending: EMERGENCY MEDICINE | Admitting: INTERNAL MEDICINE
Payer: MEDICARE

## 2021-07-01 ENCOUNTER — APPOINTMENT (OUTPATIENT)
Dept: CT IMAGING | Age: 81
DRG: 378 | End: 2021-07-01
Payer: MEDICARE

## 2021-07-01 DIAGNOSIS — K57.32 SIGMOID DIVERTICULITIS: ICD-10-CM

## 2021-07-01 DIAGNOSIS — N17.9 AKI (ACUTE KIDNEY INJURY) (HCC): ICD-10-CM

## 2021-07-01 DIAGNOSIS — K92.2 ACUTE LOWER GI BLEEDING: Primary | ICD-10-CM

## 2021-07-01 PROBLEM — N18.9 CKD (CHRONIC KIDNEY DISEASE): Status: ACTIVE | Noted: 2018-05-21

## 2021-07-01 LAB
A/G RATIO: 1.3 (ref 1.1–2.2)
ABO/RH: NORMAL
ALBUMIN SERPL-MCNC: 4 G/DL (ref 3.4–5)
ALP BLD-CCNC: 142 U/L (ref 40–129)
ALT SERPL-CCNC: 9 U/L (ref 10–40)
ANION GAP SERPL CALCULATED.3IONS-SCNC: 14 MMOL/L (ref 3–16)
ANTIBODY SCREEN: NORMAL
AST SERPL-CCNC: 10 U/L (ref 15–37)
BASOPHILS ABSOLUTE: 0.1 K/UL (ref 0–0.2)
BASOPHILS RELATIVE PERCENT: 1.4 %
BILIRUB SERPL-MCNC: 1.5 MG/DL (ref 0–1)
BILIRUBIN URINE: NEGATIVE
BLOOD, URINE: ABNORMAL
BUN BLDV-MCNC: 53 MG/DL (ref 7–20)
CALCIUM SERPL-MCNC: 9.2 MG/DL (ref 8.3–10.6)
CHLORIDE BLD-SCNC: 103 MMOL/L (ref 99–110)
CLARITY: CLEAR
CO2: 23 MMOL/L (ref 21–32)
COLOR: YELLOW
CREAT SERPL-MCNC: 4.3 MG/DL (ref 0.8–1.3)
EKG ATRIAL RATE: 80 BPM
EKG DIAGNOSIS: NORMAL
EKG Q-T INTERVAL: 430 MS
EKG QRS DURATION: 156 MS
EKG QTC CALCULATION (BAZETT): 543 MS
EKG R AXIS: -67 DEGREES
EKG T AXIS: 56 DEGREES
EKG VENTRICULAR RATE: 96 BPM
EOSINOPHILS ABSOLUTE: 0.2 K/UL (ref 0–0.6)
EOSINOPHILS RELATIVE PERCENT: 1.8 %
GFR AFRICAN AMERICAN: 16
GFR NON-AFRICAN AMERICAN: 13
GLOBULIN: 3.2 G/DL
GLUCOSE BLD-MCNC: 115 MG/DL (ref 70–99)
GLUCOSE URINE: NEGATIVE MG/DL
HCT VFR BLD CALC: 37 % (ref 40.5–52.5)
HCT VFR BLD CALC: 38.2 % (ref 40.5–52.5)
HCT VFR BLD CALC: 45.3 % (ref 40.5–52.5)
HEMOGLOBIN: 12.3 G/DL (ref 13.5–17.5)
HEMOGLOBIN: 12.5 G/DL (ref 13.5–17.5)
HEMOGLOBIN: 15 G/DL (ref 13.5–17.5)
KETONES, URINE: NEGATIVE MG/DL
LEUKOCYTE ESTERASE, URINE: NEGATIVE
LYMPHOCYTES ABSOLUTE: 1 K/UL (ref 1–5.1)
LYMPHOCYTES RELATIVE PERCENT: 11.2 %
MCH RBC QN AUTO: 30.1 PG (ref 26–34)
MCHC RBC AUTO-ENTMCNC: 33 G/DL (ref 31–36)
MCV RBC AUTO: 91 FL (ref 80–100)
MICROSCOPIC EXAMINATION: YES
MONOCYTES ABSOLUTE: 0.6 K/UL (ref 0–1.3)
MONOCYTES RELATIVE PERCENT: 6.2 %
NEUTROPHILS ABSOLUTE: 7.4 K/UL (ref 1.7–7.7)
NEUTROPHILS RELATIVE PERCENT: 79.4 %
NITRITE, URINE: NEGATIVE
OCCULT BLOOD SCREENING: ABNORMAL
PDW BLD-RTO: 18 % (ref 12.4–15.4)
PH UA: 6 (ref 5–8)
PLATELET # BLD: 264 K/UL (ref 135–450)
PMV BLD AUTO: 8.1 FL (ref 5–10.5)
POTASSIUM SERPL-SCNC: 3.8 MMOL/L (ref 3.5–5.1)
PROTEIN UA: 30 MG/DL
RBC # BLD: 4.98 M/UL (ref 4.2–5.9)
RBC UA: NORMAL /HPF (ref 0–4)
SODIUM BLD-SCNC: 140 MMOL/L (ref 136–145)
SPECIFIC GRAVITY UA: 1.01 (ref 1–1.03)
SPECIMEN STATUS: NORMAL
TOTAL PROTEIN: 7.2 G/DL (ref 6.4–8.2)
TROPONIN: 0.14 NG/ML
URINE TYPE: ABNORMAL
UROBILINOGEN, URINE: 0.2 E.U./DL
WBC # BLD: 9.4 K/UL (ref 4–11)
WBC UA: NORMAL /HPF (ref 0–5)

## 2021-07-01 PROCEDURE — 94640 AIRWAY INHALATION TREATMENT: CPT

## 2021-07-01 PROCEDURE — 93005 ELECTROCARDIOGRAM TRACING: CPT | Performed by: EMERGENCY MEDICINE

## 2021-07-01 PROCEDURE — 93010 ELECTROCARDIOGRAM REPORT: CPT | Performed by: INTERNAL MEDICINE

## 2021-07-01 PROCEDURE — 86850 RBC ANTIBODY SCREEN: CPT

## 2021-07-01 PROCEDURE — 6370000000 HC RX 637 (ALT 250 FOR IP): Performed by: EMERGENCY MEDICINE

## 2021-07-01 PROCEDURE — 84484 ASSAY OF TROPONIN QUANT: CPT

## 2021-07-01 PROCEDURE — 86901 BLOOD TYPING SEROLOGIC RH(D): CPT

## 2021-07-01 PROCEDURE — 74176 CT ABD & PELVIS W/O CONTRAST: CPT

## 2021-07-01 PROCEDURE — 86900 BLOOD TYPING SEROLOGIC ABO: CPT

## 2021-07-01 PROCEDURE — 2500000003 HC RX 250 WO HCPCS: Performed by: INTERNAL MEDICINE

## 2021-07-01 PROCEDURE — 1200000000 HC SEMI PRIVATE

## 2021-07-01 PROCEDURE — 99283 EMERGENCY DEPT VISIT LOW MDM: CPT

## 2021-07-01 PROCEDURE — G0328 FECAL BLOOD SCRN IMMUNOASSAY: HCPCS

## 2021-07-01 PROCEDURE — 80053 COMPREHEN METABOLIC PANEL: CPT

## 2021-07-01 PROCEDURE — 81001 URINALYSIS AUTO W/SCOPE: CPT

## 2021-07-01 PROCEDURE — 36415 COLL VENOUS BLD VENIPUNCTURE: CPT

## 2021-07-01 PROCEDURE — 2580000003 HC RX 258: Performed by: INTERNAL MEDICINE

## 2021-07-01 PROCEDURE — 85018 HEMOGLOBIN: CPT

## 2021-07-01 PROCEDURE — 6370000000 HC RX 637 (ALT 250 FOR IP): Performed by: INTERNAL MEDICINE

## 2021-07-01 PROCEDURE — 85025 COMPLETE CBC W/AUTO DIFF WBC: CPT

## 2021-07-01 PROCEDURE — 85014 HEMATOCRIT: CPT

## 2021-07-01 RX ORDER — ISOSORBIDE MONONITRATE 30 MG/1
30 TABLET, EXTENDED RELEASE ORAL DAILY
Status: DISCONTINUED | OUTPATIENT
Start: 2021-07-01 | End: 2021-07-03 | Stop reason: HOSPADM

## 2021-07-01 RX ORDER — ONDANSETRON 2 MG/ML
4 INJECTION INTRAMUSCULAR; INTRAVENOUS EVERY 6 HOURS PRN
Status: DISCONTINUED | OUTPATIENT
Start: 2021-07-01 | End: 2021-07-03 | Stop reason: HOSPADM

## 2021-07-01 RX ORDER — TAMSULOSIN HYDROCHLORIDE 0.4 MG/1
0.4 CAPSULE ORAL DAILY
Status: DISCONTINUED | OUTPATIENT
Start: 2021-07-01 | End: 2021-07-03 | Stop reason: HOSPADM

## 2021-07-01 RX ORDER — CARVEDILOL 6.25 MG/1
12.5 TABLET ORAL 2 TIMES DAILY WITH MEALS
Status: DISCONTINUED | OUTPATIENT
Start: 2021-07-01 | End: 2021-07-03 | Stop reason: HOSPADM

## 2021-07-01 RX ORDER — BUDESONIDE AND FORMOTEROL FUMARATE DIHYDRATE 160; 4.5 UG/1; UG/1
2 AEROSOL RESPIRATORY (INHALATION) 2 TIMES DAILY
Status: DISCONTINUED | OUTPATIENT
Start: 2021-07-01 | End: 2021-07-03 | Stop reason: HOSPADM

## 2021-07-01 RX ORDER — METRONIDAZOLE 250 MG/1
500 TABLET ORAL ONCE
Status: COMPLETED | OUTPATIENT
Start: 2021-07-01 | End: 2021-07-01

## 2021-07-01 RX ORDER — POLYETHYLENE GLYCOL 3350 17 G/17G
17 POWDER, FOR SOLUTION ORAL DAILY PRN
Status: DISCONTINUED | OUTPATIENT
Start: 2021-07-01 | End: 2021-07-03 | Stop reason: HOSPADM

## 2021-07-01 RX ORDER — ONDANSETRON 4 MG/1
4 TABLET, ORALLY DISINTEGRATING ORAL EVERY 8 HOURS PRN
Status: DISCONTINUED | OUTPATIENT
Start: 2021-07-01 | End: 2021-07-03 | Stop reason: HOSPADM

## 2021-07-01 RX ORDER — SODIUM CHLORIDE 0.9 % (FLUSH) 0.9 %
5-40 SYRINGE (ML) INJECTION PRN
Status: DISCONTINUED | OUTPATIENT
Start: 2021-07-01 | End: 2021-07-03 | Stop reason: HOSPADM

## 2021-07-01 RX ORDER — SODIUM CHLORIDE 0.9 % (FLUSH) 0.9 %
5-40 SYRINGE (ML) INJECTION EVERY 12 HOURS SCHEDULED
Status: DISCONTINUED | OUTPATIENT
Start: 2021-07-01 | End: 2021-07-03 | Stop reason: HOSPADM

## 2021-07-01 RX ORDER — LEVALBUTEROL 1.25 MG/.5ML
0.63 SOLUTION, CONCENTRATE RESPIRATORY (INHALATION) EVERY 6 HOURS PRN
Status: DISCONTINUED | OUTPATIENT
Start: 2021-07-01 | End: 2021-07-03 | Stop reason: HOSPADM

## 2021-07-01 RX ORDER — CIPROFLOXACIN 2 MG/ML
400 INJECTION, SOLUTION INTRAVENOUS DAILY
Status: DISCONTINUED | OUTPATIENT
Start: 2021-07-02 | End: 2021-07-03

## 2021-07-01 RX ORDER — ALBUTEROL SULFATE 90 UG/1
2 AEROSOL, METERED RESPIRATORY (INHALATION) EVERY 6 HOURS PRN
Status: DISCONTINUED | OUTPATIENT
Start: 2021-07-01 | End: 2021-07-03 | Stop reason: HOSPADM

## 2021-07-01 RX ORDER — ASPIRIN 81 MG/1
81 TABLET ORAL DAILY
Status: DISCONTINUED | OUTPATIENT
Start: 2021-07-01 | End: 2021-07-03 | Stop reason: HOSPADM

## 2021-07-01 RX ORDER — ACETAMINOPHEN 650 MG/1
650 SUPPOSITORY RECTAL EVERY 6 HOURS PRN
Status: DISCONTINUED | OUTPATIENT
Start: 2021-07-01 | End: 2021-07-03 | Stop reason: HOSPADM

## 2021-07-01 RX ORDER — SODIUM CHLORIDE 9 MG/ML
25 INJECTION, SOLUTION INTRAVENOUS PRN
Status: DISCONTINUED | OUTPATIENT
Start: 2021-07-01 | End: 2021-07-03 | Stop reason: HOSPADM

## 2021-07-01 RX ORDER — CIPROFLOXACIN 500 MG/1
500 TABLET, FILM COATED ORAL ONCE
Status: COMPLETED | OUTPATIENT
Start: 2021-07-01 | End: 2021-07-01

## 2021-07-01 RX ORDER — ACETAMINOPHEN 325 MG/1
650 TABLET ORAL EVERY 6 HOURS PRN
Status: DISCONTINUED | OUTPATIENT
Start: 2021-07-01 | End: 2021-07-03 | Stop reason: HOSPADM

## 2021-07-01 RX ADMIN — METRONIDAZOLE 500 MG: 500 INJECTION, SOLUTION INTRAVENOUS at 20:38

## 2021-07-01 RX ADMIN — ISOSORBIDE MONONITRATE 30 MG: 30 TABLET, EXTENDED RELEASE ORAL at 12:06

## 2021-07-01 RX ADMIN — Medication 10 ML: at 12:07

## 2021-07-01 RX ADMIN — CARVEDILOL 12.5 MG: 6.25 TABLET, FILM COATED ORAL at 17:07

## 2021-07-01 RX ADMIN — Medication 10 ML: at 20:36

## 2021-07-01 RX ADMIN — CIPROFLOXACIN 500 MG: 500 TABLET, FILM COATED ORAL at 09:38

## 2021-07-01 RX ADMIN — METRONIDAZOLE 500 MG: 500 INJECTION, SOLUTION INTRAVENOUS at 13:40

## 2021-07-01 RX ADMIN — ASPIRIN 81 MG: 81 TABLET, COATED ORAL at 12:06

## 2021-07-01 RX ADMIN — Medication 2 PUFF: at 20:17

## 2021-07-01 RX ADMIN — TAMSULOSIN HYDROCHLORIDE 0.4 MG: 0.4 CAPSULE ORAL at 12:06

## 2021-07-01 RX ADMIN — CARVEDILOL 12.5 MG: 6.25 TABLET, FILM COATED ORAL at 12:06

## 2021-07-01 RX ADMIN — METRONIDAZOLE 500 MG: 250 TABLET ORAL at 09:38

## 2021-07-01 ASSESSMENT — ENCOUNTER SYMPTOMS
SORE THROAT: 0
BACK PAIN: 0
BLOOD IN STOOL: 1
ANAL BLEEDING: 0
NAUSEA: 0
TROUBLE SWALLOWING: 0
WHEEZING: 0
CHEST TIGHTNESS: 0
ABDOMINAL DISTENTION: 0
DIARRHEA: 0
EYE DISCHARGE: 0
RHINORRHEA: 0
CONSTIPATION: 0
COUGH: 0
TACHYPNEA: 1
PHOTOPHOBIA: 0
EYE PAIN: 0
SINUS PRESSURE: 0
SHORTNESS OF BREATH: 1
VOMITING: 0
FACIAL SWELLING: 0
VOICE CHANGE: 0
STRIDOR: 0
ABDOMINAL PAIN: 0
EYE REDNESS: 0
EYE ITCHING: 0

## 2021-07-01 ASSESSMENT — PAIN SCALES - GENERAL: PAINLEVEL_OUTOF10: 0

## 2021-07-01 NOTE — PROGRESS NOTES
Consult called to [id number] [facility] SHAHID [email or web address]  ANNEL aware of it  Scotty Meth

## 2021-07-01 NOTE — ED PROVIDER NOTES
not taking: Reported on 10/16/2020 9/14/20   Cloteal Coral Altman DO   levalbuterol [facility]) 0.63 MG/3ML nebulization Take 3 mLs by nebulization every 6 hours as needed for Wheezing  Patient not taking: Reported on 2020   Madhu Condon MD   isosorbide mononitrate (IMDUR) 30 MG CR tablet Take 30 mg by mouth daily    Historical Provider, MD       Allergies as of 2021 - Fully Reviewed 2021   Allergen Reaction Noted    Pcn [penicillins] Rash 2010       Past Medical History:   Diagnosis Date    Allergic rhinitis     HX of    Atrial [facility] ([facility]     CHF (congestive heart failure) (HCC)     COPD (chronic obstructive pulmonary disease) (HCC)     Dizziness     Elevated PSA     Enlarged prostate     Hearing loss     Hypertension     Hyperthyroidism     resolved    Nosebleed     Right leg DVT ([facility] 2015    Tinnitus         Surgical History:   Past Surgical History:   Procedure Laterality Date    CARDIAC SURGERY      mitral valve repair    COLONOSCOPY  2017    COLONOSCOPY  2018    polyp removal, sigmoid diverticulosis, rectal bleeding    CYSTOSCOPY  1/20/15    Clot Evacuation    CYSTOSCOPY  2/18/15    HERNIA REPAIR      TURP  2/18/15    CLOT EVACUATION, TRANSURETHRAL RESECTION OF THE PROSTATE        Family History:  History reviewed. No pertinent family history.     Social History     Socioeconomic History    Marital status:      Spouse name: Not on file    Number of children: Not on file    Years of education: Not on file    Highest education level: Not on file   Occupational History    Not on file   Tobacco Use    Smoking status: Former Smoker     Years: 30.00     Quit date: 2002     Years since quittin.5    Smokeless tobacco: Never Used    Tobacco comment: 2 pipes per day   Vaping Use    Vaping Use: Never used   Substance and Sexual Activity    Alcohol use: No    Drug use: No    Sexual activity: Yes     Partners: Female   Other Topics Concern    Not on file   Social History Narrative    Not on file     Social Determinants of Health     Financial Resource Strain:     Difficulty of Paying Living Expenses:    Food Insecurity:     Worried About Running Out of Food in the Last Year:     [address] in the Last Year:    Transportation Needs:     Lack of Transportation (Medical):  Lack of Transportation (Non-Medical):    Physical Activity:     Days of Exercise per Week:     Minutes of Exercise per Session:    Stress:     Feeling of Stress :    Social Connections:     Frequency of Communication with Friends and Family:     Frequency of Social Gatherings with Friends and Family:     Attends Congregation Services:     Active Member of Clubs or Organizations:     Attends Club or Organization Meetings:     Marital Status:    Intimate Partner Violence:     Fear of Current or Ex-Partner:     Emotionally Abused:     Physically Abused:     Sexually Abused:          Review of Systems   Constitutional: Negative for activity change, appetite change, chills, diaphoresis, fatigue and fever. HENT: Negative. Negative for congestion, dental problem, ear pain, facial swelling, rhinorrhea, sinus pressure, sneezing, sore throat, tinnitus, trouble swallowing and voice change. Eyes: Negative for photophobia, pain, discharge, redness, itching and visual disturbance. Respiratory: Positive for shortness of breath. Negative for cough, chest tightness, wheezing and stridor. Cardiovascular: Negative for chest pain, palpitations and leg swelling. Gastrointestinal: Positive for blood in stool. Negative for abdominal distention, abdominal pain, anal bleeding, constipation, diarrhea, nausea and vomiting. Genitourinary: Negative for difficulty urinating, discharge, dysuria, frequency, hematuria, testicular pain and urgency. Musculoskeletal: Negative for back pain, joint swelling, neck pain and neck stiffness.    Skin: Negative for rash and wound.   Neurological: Positive for weakness. Negative for dizziness, syncope, facial asymmetry, speech difficulty, numbness and headaches. Hematological: Does not bruise/bleed easily. Psychiatric/Behavioral: Negative for agitation, confusion, hallucinations, self-injury, sleep disturbance and suicidal ideas. The patient is not nervous/anxious. All other systems reviewed and are negative. Physical Exam  Vitals and nursing note reviewed. Constitutional:       General: He is not in acute distress. Appearance: He is well-developed. HENT:      Head: Normocephalic and atraumatic. Right Ear: External ear normal.      Left Ear: External ear normal.      Nose: Nose normal.      Mouth/Throat:      Pharynx: No oropharyngeal exudate. Eyes:      General: No scleral icterus. Right eye: No discharge. Left eye: No discharge. Conjunctiva/sclera: Conjunctivae normal.      Pupils: Pupils are equal, round, and reactive to light. Neck:      Vascular: No JVD. Trachea: No tracheal deviation. Cardiovascular:      Rate and Rhythm: Normal rate and regular rhythm. Heart sounds: Normal heart sounds. No murmur heard. No friction rub. No gallop. Pulmonary:      Effort: Pulmonary effort is normal. No respiratory distress. Breath sounds: Normal breath sounds. No wheezing or rales. Abdominal:      General: Bowel sounds are normal. There is no distension. Palpations: Abdomen is soft. There is no mass. Tenderness: There is no abdominal tenderness. There is no guarding or rebound. Comments: PATIENT REFUSED RECTAL EXAMINATION. Musculoskeletal:         General: No tenderness. Normal range of motion. Cervical back: Normal range of motion and neck supple. Lymphadenopathy:      Cervical: No cervical adenopathy. Skin:     General: Skin is warm and dry. Coloration: Skin is not pale. Findings: No erythema or rash.    Neurological:      Mental Status: Result Value Ref Range    Ventricular Rate 96 BPM    Atrial Rate 80 BPM    QRS Duration 156 ms    Q-T Interval 430 ms    QTc Calculation (Bazett) 543 ms    R Axis -67 degrees    T Axis 56 degrees    Diagnosis       Wide QRS rhythm with Premature supraventricular complexes with occasional Premature ventricular complexesRight bundle branch blockLeft anterior fascicular block** Bifascicular block **Cannot rule out Inferior infarct (masked by fascicular block?) , age undeterminedAbnormal ECGWhen compared with ECG of 21-SEP-2020 14:33,Wide QRS rhythm has replaced Atrial fibrillation       I spoke with Dr. Ramez Mccullough, hospitalist. We thoroughly discussed the history, physical exam, laboratory and imaging studies, as well as, emergency department course. Based upon that discussion, we've decided to admit Dian Nichols for further observation and evaluation of Cal Santillan's abdominal pain. As I have deemed necessary from their history, physical and studies, I have considered and evaluated Dian Nichols for the following diagnoses:  ACUTE APPENDICITIS, BOWEL OBSTRUCTION, CHOLECYSTITIS, DIVERTICULITIS, INCARCERATED HERNIA, PANCREATITIS, or PERFORATED BOWEL or ULCER. FINAL IMPRESSION  1. Acute lower GI bleeding    2. Sigmoid diverticulitis    3. SHALINI (acute kidney injury) ([address]        Vitals:  Blood pressure (!) 163/107, pulse 76, temperature 98 °F (36.7 °C), temperature source Oral, resp. rate 18, height 5' 10\" (1.778 m), weight 165 lb (74.8 kg), SpO2 94 %. Radiology  CT ABDOMEN PELVIS WO CONTRAST Additional Contrast? None    Result Date: 7/1/2021  Suspect low-grade sigmoid diverticulitis with mild mural thickening and trace adjacent infiltration. Right pleural effusion with moderate cardiomegaly increased from the prior study. Right inguinal hernia containing fat but also tethering air-filled adjacent small bowel loop. EKG Interpretation.   The Ekg interpreted by me in the absence of a cardiologist shows. normal sinus rhythm with a rate of 96 with RBBB and LAHB prsent  Axis is   Normal  QTc is  543 ms  Intervals and Durations are unremarkable. No specific ST-T wave changes appreciated. No evidence of acute ischemia. No significant change from prior EKG dated 21 September 2020.           Remy Walker MD  07/01/21 1350       Remy Walker MD  07/01/21 1702

## 2021-07-01 NOTE — PROGRESS NOTES
4 Eyes Skin Assessment     NAME:  Varinder Santillan  YOB: 1940  MEDICAL RECORD NUMBER:  [id number]    The patient is being assess for  {Reason for Assessment:90649}    I agree that 2 RN's have performed a thorough Head to Toe Skin Assessment on the patient. ALL assessment sites listed below have been assessed. Areas assessed by both nurses:    {Pressure Areas Assessed:42079}        Does the Patient have a Wound?  {Action Wound:45858}       Justen Prevention initiated:  {YES/NO:19732}   Wound Care Orders initiated:  {YES/NO:19732}    Pressure Injury (Stage 3,4, Unstageable, DTI, NWPT, and Complex wounds) if present place consult order under [de-identified] {YES/NO:19732}    New and Established Ostomies if present place consult order under : {YES/NO:19732}      Nurse 1 eSignature: {Esignature:[id number]}    **SHARE this note so that the co-signing nurse is able to place an eSignature**    Nurse 2 eSignature: {Esignature:[id number]}

## 2021-07-01 NOTE — ED NOTES
Pt refused to let Dr. Ranjana Wiley and writer preform rectal exam at this time     Dyllan Rhodes RN  07/01/21 9947

## 2021-07-01 NOTE — RT PROTOCOL NOTE
Therapy Protocol Assessment with second treatment then BID and as needed. If Albuterol Inhaler not tolerated or not effective, then discontinue the Albuterol Inhaler orders and enter two Albuterol Nebulizer orders with same frequencies and PRN reasons. 11-13 - discontinue any other Inpatient aerosolized bronchodilator medication orders and enter DuoNeb Nebulizer orders QID frequency and an Albuterol Nebulizer order every 2 hours PRN wheezing or increased work of breathing using Per Protocol order mode. Repeat RT Therapy Protocol Assessment with second treatment then QID and as needed. Greater than 13 - discontinue any other Inpatient bronchodilator aerosolized medication orders and enter DuoNeb Nebulizer order every 4 hours frequency and Albuterol Nebulizer every 2 hours PRN wheezing or increased work of breathing using Per Protocol order mode. Repeat RT Therapy Protocol Assessment with second treatment then every 4 hours and as needed. RT to enter RT Home Evaluation for COPD & MDI Assessment order using Per Protocol order mode.     Electronically signed by Clarice Jeronimo RCP on 7/1/2021 at 12:13 PM

## 2021-07-01 NOTE — PROGRESS NOTES
Patient admitted to room 356 from ED. Patient oriented to room, call light, bed rails, phone, lights and bathroom. Patient instructed about the schedule of the day including: vital sign frequency, lab draws, possible tests, frequency of MD and staff rounds, including RN/MD rounding together at bedside, daily weights, and I &O's. Patient instructed about prescribed diet, how to use dietary service, and television. Bed frame alarm in place, patient aware of placement and reason. Telemetry box in place, patient aware of placement and reason. Bed locked, in lowest position, side rails up 2/4, call light within reach. Will continue to monitor.

## 2021-07-01 NOTE — CARE COORDINATION
CASE MANAGEMENT INITIAL ASSESSMENT      Reviewed chart and completed assessment at bedside with patient    Explained Case Management role/services. yes    Health Care Decision Maker :   Primary Decision Maker: [address] [phone]    Secondary Decision Maker: Xavier Hensley - Child - [phone]    Supplemental (Other) Decision Maker: Ruta Dance - Child - [phone]          Can this person be reached and be able to respond quickly, such as within a few minutes or hours? Yes    Admit date/status: inpatient 07/01/21   Diagnosis: GID     Is this a Readmission?:  No      Insurance: Medicare     Precert required for SNF: No       3 night stay required: No    Living arrangements, Adls, care needs, prior to admission: lives w/spouse, IPTA, active     Transportation: likely family     [address] at home:  Denies    Services in the home and/or outpatient, prior to admission: none    PT/OT recs: [address] Notification (HEN): not started    Barriers to discharge: none    Plan/comments: pt intends to d/c home without needs. Please consult CM team if needs arise.      Markel Arango RN

## 2021-07-01 NOTE — H&P
Hospital Medicine History & Physical      PCP: Estelita Kerr DO    Date of Admission: 7/1/2021    Date of Service: Pt seen/examined on 2 July and Admitted to Inpatient with expected LOS greater than two midnights due to medical therapy. Chief Complaint:  Rectal Bleeding. History Of Present Illness:       79 y.o. male w/ CAD on ASA and Afib w/out aggressive anticoagulation who presented to [facility] with a 1 day hx of BRBPR. He denies any associated abdominal pain or N/V. The patient denies any fever/chills or other signs/sxs of systemic illness or identifiable aggravating/alleviating factors. Past Medical History:          Diagnosis Date    Allergic rhinitis     HX of    Atrial fibrillation ([address]     CHF (congestive heart failure) (HCC)     COPD (chronic obstructive pulmonary disease) (HCC)     Dizziness     Elevated PSA     Enlarged prostate     Hearing loss     Hypertension     Hyperthyroidism     resolved    Nosebleed     Right leg DVT ([facility] 2/2/2015    Tinnitus        Past Surgical History:          Procedure Laterality Date    CARDIAC SURGERY      mitral valve repair    COLONOSCOPY  06/19/2017    COLONOSCOPY  05/22/2018    polyp removal, sigmoid diverticulosis, rectal bleeding    CYSTOSCOPY  1/20/15    Clot Evacuation    CYSTOSCOPY  2/18/15    HERNIA REPAIR      TURP  2/18/15    CLOT EVACUATION, TRANSURETHRAL RESECTION OF THE PROSTATE       Medications Prior to Admission:      Prior to Admission medications    Medication Sig Start Date End Date Taking?  Authorizing Provider   carvedilol (COREG) 12.5 MG tablet Take 1 tablet by mouth 2 times daily (with meals) 9/23/20  Yes TYLOR Tadeo   tamsulosin [facility]) 0.4 MG capsule Take 1 capsule by mouth daily 9/23/20  Yes TYLOR Tadeo   fluticasone-umeclidin-vilant (TRELEGY ELLIPTA) 789-58.8-55 MCG/INH AEPB Inhale 1 puff into the lungs daily 9/15/20  Yes Rajinder Altman DO   Umeclidinium Bromide 62.5 MCG/INH AEPB Inhale 62.5 mcg into the lungs daily 9/14/20  Yes Klesie Norma Ashar, DO   fluticasone-salmeterol (ADVAIR DISKUS) 250-50 MCG/DOSE AEPB Inhale 1 puff into the lungs 2 times daily 9/14/20  Yes Kelsie Altman, DO   aspirin 81 MG tablet Take 81 mg by mouth daily   Yes Historical Provider, MD   levalbuterol [facility]) 0.63 MG/3ML nebulization Take 3 mLs by nebulization every 6 hours as needed for Wheezing 11/6/18  Yes Misty Dumont MD   albuterol sulfate HFA (PROVENTIL HFA) 108 (90 Base) MCG/ACT inhaler Inhale 2 puffs into the lungs every 6 hours as needed for Wheezing 11/6/18  Yes Misty Dumont MD   Spacer/Aero-Holding Chambers (E-Z SPACER) MARIFER 1 Device by Does not apply route daily as needed (prn with Albuterol) 11/6/18  Yes Misty Dumont MD   isosorbide mononitrate (IMDUR) 30 MG CR tablet Take 30 mg by mouth daily   Yes Historical Provider, MD       Allergies:  Pcn [penicillins]    Social History:      The patient currently lives independently    TOBACCO:   reports that he quit smoking about 19 years ago. He quit after 30.00 years of use. He has never used smokeless tobacco.  ETOH:   reports no history of alcohol use. Family History:      Reviewed in detail and negative for DM, CAD, Cancer, CVA. Positive as follows:    History reviewed. No pertinent family history. REVIEW OF SYSTEMS:   Pertinent positives as noted in the HPI. All other systems reviewed and negative. PHYSICAL EXAM:    /71   Pulse 70   Temp 98.3 °F (36.8 °C) (Oral)   Resp 16   Ht 5' 10\" (1.778 m)   Wt 160 lb 1.6 oz (72.6 kg)   SpO2 96%   BMI 22.97 kg/m²     General appearance:  No apparent distress, appears stated age and cooperative. HEENT:  Normal cephalic, atraumatic without obvious deformity. Pupils equal, round, and reactive to light. Extra ocular muscles intact. Conjunctivae/corneas clear. Neck: Supple, with full range of motion. No jugular venous distention. Trachea midline.   Respiratory: High    S/P mitral valve repair [Z98.890] 01/21/2015     Priority: Medium    Permanent atrial fibrillation ([facility] [I48.21]      Priority: Medium    Diverticulitis [K57.92] 07/02/2021    GI bleed [K92.2] 07/01/2021    CKD (chronic kidney disease) [N18.9] 05/21/2018    CHF (congestive heart failure) ([facility] [I50.9] 01/19/2013    Hyperthyroidism [E05.90]        PLAN:      Diverticulitis - CT scan suggested  Sigmoid diverticulitis. Started on Cipro/Flagyl 1 July. Advance diet as tolerated. GI Bleed - possibly 2nd to above. Follow serial labs. Reviewed and documented as above. GI consulted and appreciated in advance but most likely defer invasive w/up outpt. HTN - w/out known CAD and no evidence of active signs/sxs of ischemia/failure. Currently controlled on home meds w/ vitals reviewed and documented as above. CHF - chronic systolic failure w/ reduced EF 45% by Echo dated Dec 2016. Likely due to hypertensive heart disease. No evidence of acute decompensation. Continue current medical mgt. Afib - chronic persistent of unspecified and clinically unable to determine etiology. S/P prior MVR. Normally rate controlled on BBlocker - continued. Not anticoagulated at baseline. Monitored on tele. CKD - baseline stage 3. Follow serial labs. Reviewed and documented as above. Troponin elevation - of unclear clinical significance likely 2nd to CHF/CKD, w/out signs/sxs of active ischemia. Followed serial troponins - Stable. Reviewed and documented as above. HypoThyroid - clinically euthyroid on oral replacement therapy. Continue, w/ outpt monitoring as previously arranged. BPH - w/out acute obstructive Uropathy, controlled on home medications as ordered - continued. DVT Prophylaxis: IPC  Diet: ADULT DIET; Regular; Low Fiber  Code Status: Full Code      PT/OT Eval Status: Not yet ordered. Dispo - Possibly Sat/Chong 3/4 July pending clinical course and subspecialty recs. Lindwood Cabot, MD    Thank you Willam Rivas DO for the opportunity to be involved in this patient's care. If you have any questions or concerns please feel free to contact me at [phone].

## 2021-07-02 PROBLEM — K57.92 DIVERTICULITIS: Status: ACTIVE | Noted: 2021-07-02

## 2021-07-02 LAB
ALBUMIN SERPL-MCNC: 3.3 G/DL (ref 3.4–5)
ANION GAP SERPL CALCULATED.3IONS-SCNC: 12 MMOL/L (ref 3–16)
BUN BLDV-MCNC: 51 MG/DL (ref 7–20)
CALCIUM SERPL-MCNC: 8.4 MG/DL (ref 8.3–10.6)
CHLORIDE BLD-SCNC: 107 MMOL/L (ref 99–110)
CO2: 21 MMOL/L (ref 21–32)
CREAT SERPL-MCNC: 4.5 MG/DL (ref 0.8–1.3)
GFR AFRICAN AMERICAN: 15
GFR NON-AFRICAN AMERICAN: 13
GLUCOSE BLD-MCNC: 86 MG/DL (ref 70–99)
HCT VFR BLD CALC: 36.2 % (ref 40.5–52.5)
HEMOGLOBIN: 12 G/DL (ref 13.5–17.5)
MCH RBC QN AUTO: 29.6 PG (ref 26–34)
MCHC RBC AUTO-ENTMCNC: 33.1 G/DL (ref 31–36)
MCV RBC AUTO: 89.4 FL (ref 80–100)
PDW BLD-RTO: 17.6 % (ref 12.4–15.4)
PHOSPHORUS: 4.3 MG/DL (ref 2.5–4.9)
PLATELET # BLD: 213 K/UL (ref 135–450)
PMV BLD AUTO: 8.9 FL (ref 5–10.5)
POTASSIUM SERPL-SCNC: 4 MMOL/L (ref 3.5–5.1)
RBC # BLD: 4.05 M/UL (ref 4.2–5.9)
SODIUM BLD-SCNC: 140 MMOL/L (ref 136–145)
WBC # BLD: 7.8 K/UL (ref 4–11)

## 2021-07-02 PROCEDURE — 6370000000 HC RX 637 (ALT 250 FOR IP): Performed by: INTERNAL MEDICINE

## 2021-07-02 PROCEDURE — 36415 COLL VENOUS BLD VENIPUNCTURE: CPT

## 2021-07-02 PROCEDURE — 2580000003 HC RX 258: Performed by: INTERNAL MEDICINE

## 2021-07-02 PROCEDURE — 1200000000 HC SEMI PRIVATE

## 2021-07-02 PROCEDURE — 2500000003 HC RX 250 WO HCPCS: Performed by: INTERNAL MEDICINE

## 2021-07-02 PROCEDURE — 80069 RENAL FUNCTION PANEL: CPT

## 2021-07-02 PROCEDURE — 85027 COMPLETE CBC AUTOMATED: CPT

## 2021-07-02 PROCEDURE — 6360000002 HC RX W HCPCS: Performed by: INTERNAL MEDICINE

## 2021-07-02 PROCEDURE — 94640 AIRWAY INHALATION TREATMENT: CPT

## 2021-07-02 RX ADMIN — Medication 10 ML: at 20:59

## 2021-07-02 RX ADMIN — Medication 2 PUFF: at 07:35

## 2021-07-02 RX ADMIN — ASPIRIN 81 MG: 81 TABLET, COATED ORAL at 08:09

## 2021-07-02 RX ADMIN — METRONIDAZOLE 500 MG: 500 INJECTION, SOLUTION INTRAVENOUS at 08:24

## 2021-07-02 RX ADMIN — CARVEDILOL 12.5 MG: 6.25 TABLET, FILM COATED ORAL at 16:34

## 2021-07-02 RX ADMIN — Medication 2 PUFF: at 20:04

## 2021-07-02 RX ADMIN — CIPROFLOXACIN 400 MG: 2 INJECTION, SOLUTION INTRAVENOUS at 10:09

## 2021-07-02 RX ADMIN — ISOSORBIDE MONONITRATE 30 MG: 30 TABLET, EXTENDED RELEASE ORAL at 08:09

## 2021-07-02 RX ADMIN — TAMSULOSIN HYDROCHLORIDE 0.4 MG: 0.4 CAPSULE ORAL at 08:10

## 2021-07-02 RX ADMIN — Medication 10 ML: at 08:10

## 2021-07-02 RX ADMIN — METRONIDAZOLE 500 MG: 500 INJECTION, SOLUTION INTRAVENOUS at 21:00

## 2021-07-02 RX ADMIN — METRONIDAZOLE 500 MG: 500 INJECTION, SOLUTION INTRAVENOUS at 14:20

## 2021-07-02 RX ADMIN — CARVEDILOL 12.5 MG: 6.25 TABLET, FILM COATED ORAL at 08:09

## 2021-07-02 ASSESSMENT — PAIN SCALES - GENERAL
PAINLEVEL_OUTOF10: 0

## 2021-07-02 NOTE — CONSULTS
Family: History reviewed. No pertinent family history. No current facility-administered medications on file prior to encounter. Current Outpatient Medications on File Prior to Encounter   Medication Sig Dispense Refill    carvedilol (COREG) 12.5 MG tablet Take 1 tablet by mouth 2 times daily (with meals) 60 tablet 1    tamsulosin (FLOMAX) 0.4 MG capsule Take 1 capsule by mouth daily 30 capsule 1    fluticasone-umeclidin-vilant (TRELEGY ELLIPTA) 100-62.5-25 MCG/INH AEPB Inhale 1 puff into the lungs daily 1 each 0    Umeclidinium Bromide 62.5 MCG/INH AEPB Inhale 62.5 mcg into the lungs daily 30 each 1    fluticasone-salmeterol (ADVAIR DISKUS) 250-50 MCG/DOSE AEPB Inhale 1 puff into the lungs 2 times daily 1 each 5    aspirin 81 MG tablet Take 81 mg by mouth daily      levalbuterol (XOPENEX) 0.63 MG/3ML nebulization Take 3 mLs by nebulization every 6 hours as needed for Wheezing 125 mL 5    albuterol sulfate HFA (PROVENTIL HFA) 108 (90 Base) MCG/ACT inhaler Inhale 2 puffs into the lungs every 6 hours as needed for Wheezing 1 Inhaler 5    Spacer/Aero-Holding Chambers (E-Z SPACER) MARIFER 1 Device by Does not apply route daily as needed (prn with Albuterol) 1 Device 0    isosorbide mononitrate (IMDUR) 30 MG CR tablet Take 30 mg by mouth daily        Infusions:    sodium chloride       PRN Medications: albuterol sulfate HFA, levalbuterol, sodium chloride flush, sodium chloride, ondansetron **OR** ondansetron, polyethylene glycol, acetaminophen **OR** acetaminophen  Allergies:    Allergies   Allergen Reactions    Pcn [Penicillins] Rash     Skin peeling Steva [place] Michel reaction       ROS: Constitutional: negative for chills, fevers and sweats  Eyes: negative for cataracts, icterus and redness  Ears, nose, mouth, throat, and face: negative for epistaxis, hearing loss and sore throat  Respiratory: negative for cough, hemoptysis and sputum  Cardiovascular: negative for chest pain, dyspnea and lower extremity edema  Gastrointestinal: as per HPI  Genitourinary:negative for dysuria, frequency and hematuria  Neurological: negative for coordination problems, dizziness and gait problems  Behavioral/Psych: negative for anxiety, depression and mood swings    Physical Exam   /71   Pulse 70   Temp 98.3 °F (36.8 °C) (Oral)   Resp 16   Ht 5' 10\" (1.778 m)   Wt 160 lb 1.6 oz (72.6 kg)   SpO2 96%   BMI 22.97 kg/m²     General appearance: alert, appears stated age and cooperative  Head: Normocephalic, without obvious abnormality, atraumatic  Eyes: conjunctivae/corneas clear. PERRL, EOM's intact. Fundi benign. Neck: no adenopathy and supple, symmetrical, trachea midline  Lungs: clear to auscultation bilaterally  Heart: regular rate and rhythm, S1, S2 normal, no murmur, click, rub or gallop  Abdomen: soft, non-tender; bowel sounds normal; no masses,  no organomegaly  Extremities: extremities normal, atraumatic, no cyanosis or edema    Lab and Imaging Review   Labs:  CBC:   Recent Labs     07/01/21  0748 07/01/21  0748 07/01/21  1534 07/01/21  2121 07/02/21  0554   WBC 9.4  --   --   --  7.8   HGB 15.0   < > 12.5* 12.3* 12.0*   HCT 45.3   < > 38.2* 37.0* 36.2*   MCV 91.0  --   --   --  89.4     --   --   --  213    < > = values in this interval not displayed. BMP:   Recent Labs     07/01/21  0748 07/02/21  0553    140   K 3.8 4.0    107   CO2 23 21   PHOS  --  4.3   BUN 53* 51*   CREATININE 4.3* 4.5*     LIVER PROFILE:   Recent Labs     07/01/21  0748   AST 10*   ALT 9*   PROT 7.2   BILITOT 1.5*   ALKPHOS 142*     CT abd/pelvis:  Suspect low-grade sigmoid diverticulitis with mild mural thickening and trace   adjacent infiltration.       Right pleural effusion with moderate cardiomegaly increased from the prior   study.       Right inguinal hernia containing fat but also tethering air-filled adjacent   small bowel loop.          Assessment:     79year old male with history of HTN, A fib, COPD, CHF, mitral valve replacement, DVT, BPH s/p TURP, Hyperthyroidism admitted with painless hematochezia likely secondary to diverticulosis. CT suggests low grade diverticulitis    Plan:   1. Continue supportive care  2. Monitor Hgb  3. Observe for signs of bleeding  4. Continue broad spectrum antibiotics x 14d  5. Advance to low fiber diet  6.  Outpatient colonoscopy in 4wks upon discharge      Kellie Rhodes MD, MD  2:37 PM 7/2/2021

## 2021-07-03 VITALS
BODY MASS INDEX: 22.92 KG/M2 | HEART RATE: 62 BPM | OXYGEN SATURATION: 98 % | SYSTOLIC BLOOD PRESSURE: 117 MMHG | TEMPERATURE: 97.5 F | DIASTOLIC BLOOD PRESSURE: 69 MMHG | WEIGHT: 160.1 LBS | RESPIRATION RATE: 16 BRPM | HEIGHT: 70 IN

## 2021-07-03 LAB
ALBUMIN SERPL-MCNC: 3.2 G/DL (ref 3.4–5)
ANION GAP SERPL CALCULATED.3IONS-SCNC: 13 MMOL/L (ref 3–16)
BUN BLDV-MCNC: 53 MG/DL (ref 7–20)
CALCIUM SERPL-MCNC: 8.4 MG/DL (ref 8.3–10.6)
CHLORIDE BLD-SCNC: 107 MMOL/L (ref 99–110)
CO2: 20 MMOL/L (ref 21–32)
CREAT SERPL-MCNC: 4.3 MG/DL (ref 0.8–1.3)
GFR AFRICAN AMERICAN: 16
GFR NON-AFRICAN AMERICAN: 13
GLUCOSE BLD-MCNC: 93 MG/DL (ref 70–99)
HCT VFR BLD CALC: 35.5 % (ref 40.5–52.5)
HEMOGLOBIN: 11.8 G/DL (ref 13.5–17.5)
MAGNESIUM: 2.1 MG/DL (ref 1.8–2.4)
MCH RBC QN AUTO: 30 PG (ref 26–34)
MCHC RBC AUTO-ENTMCNC: 33.2 G/DL (ref 31–36)
MCV RBC AUTO: 90.4 FL (ref 80–100)
PDW BLD-RTO: 17.8 % (ref 12.4–15.4)
PHOSPHORUS: 4.4 MG/DL (ref 2.5–4.9)
PLATELET # BLD: 207 K/UL (ref 135–450)
PMV BLD AUTO: 7.9 FL (ref 5–10.5)
POTASSIUM SERPL-SCNC: 4.1 MMOL/L (ref 3.5–5.1)
RBC # BLD: 3.93 M/UL (ref 4.2–5.9)
SODIUM BLD-SCNC: 140 MMOL/L (ref 136–145)
WBC # BLD: 7.2 K/UL (ref 4–11)

## 2021-07-03 PROCEDURE — 85027 COMPLETE CBC AUTOMATED: CPT

## 2021-07-03 PROCEDURE — 6370000000 HC RX 637 (ALT 250 FOR IP): Performed by: INTERNAL MEDICINE

## 2021-07-03 PROCEDURE — 2580000003 HC RX 258: Performed by: HOSPITALIST

## 2021-07-03 PROCEDURE — 2580000003 HC RX 258: Performed by: INTERNAL MEDICINE

## 2021-07-03 PROCEDURE — 6360000002 HC RX W HCPCS: Performed by: HOSPITALIST

## 2021-07-03 PROCEDURE — 6370000000 HC RX 637 (ALT 250 FOR IP): Performed by: HOSPITALIST

## 2021-07-03 PROCEDURE — 80069 RENAL FUNCTION PANEL: CPT

## 2021-07-03 PROCEDURE — 2500000003 HC RX 250 WO HCPCS: Performed by: INTERNAL MEDICINE

## 2021-07-03 PROCEDURE — 83735 ASSAY OF MAGNESIUM: CPT

## 2021-07-03 RX ORDER — CIPROFLOXACIN 500 MG/1
500 TABLET, FILM COATED ORAL EVERY 12 HOURS SCHEDULED
Qty: 24 TABLET | Refills: 0 | Status: SHIPPED | OUTPATIENT
Start: 2021-07-03 | End: 2021-07-03

## 2021-07-03 RX ORDER — SODIUM CHLORIDE 9 MG/ML
INJECTION, SOLUTION INTRAVENOUS CONTINUOUS
Status: DISCONTINUED | OUTPATIENT
Start: 2021-07-03 | End: 2021-07-03 | Stop reason: HOSPADM

## 2021-07-03 RX ORDER — CIPROFLOXACIN 500 MG/1
500 TABLET, FILM COATED ORAL DAILY
Qty: 12 TABLET | Refills: 0 | Status: SHIPPED | OUTPATIENT
Start: 2021-07-03 | End: 2021-07-15

## 2021-07-03 RX ORDER — CIPROFLOXACIN 500 MG/1
500 TABLET, FILM COATED ORAL EVERY 12 HOURS SCHEDULED
Status: DISCONTINUED | OUTPATIENT
Start: 2021-07-03 | End: 2021-07-03

## 2021-07-03 RX ORDER — METRONIDAZOLE 500 MG/1
500 TABLET ORAL EVERY 8 HOURS SCHEDULED
Qty: 36 TABLET | Refills: 0 | Status: SHIPPED | OUTPATIENT
Start: 2021-07-03 | End: 2021-07-15

## 2021-07-03 RX ORDER — CIPROFLOXACIN 500 MG/1
250 TABLET, FILM COATED ORAL EVERY 12 HOURS SCHEDULED
Status: DISCONTINUED | OUTPATIENT
Start: 2021-07-04 | End: 2021-07-03 | Stop reason: HOSPADM

## 2021-07-03 RX ORDER — METRONIDAZOLE 250 MG/1
500 TABLET ORAL EVERY 8 HOURS SCHEDULED
Status: DISCONTINUED | OUTPATIENT
Start: 2021-07-03 | End: 2021-07-03 | Stop reason: HOSPADM

## 2021-07-03 RX ADMIN — SODIUM CHLORIDE 25 ML: 9 INJECTION, SOLUTION INTRAVENOUS at 09:16

## 2021-07-03 RX ADMIN — METRONIDAZOLE 500 MG: 500 INJECTION, SOLUTION INTRAVENOUS at 09:17

## 2021-07-03 RX ADMIN — Medication 10 ML: at 09:10

## 2021-07-03 RX ADMIN — ASPIRIN 81 MG: 81 TABLET, COATED ORAL at 09:11

## 2021-07-03 RX ADMIN — CIPROFLOXACIN 500 MG: 500 TABLET, FILM COATED ORAL at 10:08

## 2021-07-03 RX ADMIN — METRONIDAZOLE 500 MG: 250 TABLET ORAL at 13:08

## 2021-07-03 RX ADMIN — CARVEDILOL 12.5 MG: 6.25 TABLET, FILM COATED ORAL at 09:17

## 2021-07-03 RX ADMIN — ISOSORBIDE MONONITRATE 30 MG: 30 TABLET, EXTENDED RELEASE ORAL at 09:11

## 2021-07-03 RX ADMIN — TAMSULOSIN HYDROCHLORIDE 0.4 MG: 0.4 CAPSULE ORAL at 09:11

## 2021-07-03 RX ADMIN — IRON SUCROSE 100 MG: 20 INJECTION, SOLUTION INTRAVENOUS at 13:11

## 2021-07-03 ASSESSMENT — PAIN SCALES - GENERAL
PAINLEVEL_OUTOF10: 0

## 2021-07-03 NOTE — DISCHARGE INSTR - COC
Continuity of Care Form    Patient Name: Mohit Chamberlain   :  1940  MRN:  [id number]    Admit date:  2021  Discharge date:  ***    Code Status Order: Full Code   Advance Directives:   Advance Care Flowsheet Documentation     Date/Time Healthcare Directive Type of Healthcare Directive Copy in [address] Agent's Name Healthcare Agent's Phone Number    21 1049  No, patient does not have an advance directive for healthcare treatment -- -- -- -- --          Admitting Physician:  Paul Esqueda MD  PCP: Beatriz Goldmann, DO    Discharging Nurse: [facility] Unit/Room#: 4944/6347-68  Discharging Unit Phone Number: ***    Emergency Contact:   Extended Emergency Contact Information  Primary Emergency Contact: Carola Santillan  Address: [address] Phone: [phone]  Relation: Spouse  Secondary Emergency Contact: [address] [place] [address] Phone: [phone]  Mobile Phone: [phone]  Relation: Child    Past Surgical History:  Past Surgical History:   Procedure Laterality Date    CARDIAC SURGERY      mitral valve repair    COLONOSCOPY  2017    COLONOSCOPY  2018    polyp removal, sigmoid diverticulosis, rectal bleeding    CYSTOSCOPY  1/20/15    Clot Evacuation    CYSTOSCOPY  2/18/15    HERNIA REPAIR      TURP  2/18/15    CLOT EVACUATION, TRANSURETHRAL RESECTION OF THE PROSTATE       Immunization History: There is no immunization history on file for this patient.     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    Hematochezia K92.1    CKD (chronic kidney disease) N18.9    Hypertensive urgency I16.0    Former smoker Z87.891    Diverticulosis K57.90    SHALINI (acute kidney injury) ([facility] N17.9    Dizziness R42    Bacterial urinary tract infection N39.0, A49.9    Visual hallucinations R44.1    Acute kidney injury superimposed on chronic kidney disease (HCC) N17.9, N18.9    Chronic a-fib ([facility]) I48.20    GI bleed K92.2    Diverticulitis K57.92       Isolation/Infection:   Isolation          No Isolation        Patient Infection Status     None to display          Nurse Assessment:  Last Vital Signs: /69   Pulse 62   Temp 97.5 °F (36.4 °C) (Oral)   Resp 16   Ht 5' 10\" (1.778 m)   Wt 160 lb 1.6 oz (72.6 kg)   SpO2 98%   BMI 22.97 kg/m²     Last documented pain score (0-10 scale): Pain Level: 0  Last Weight:   Wt Readings from Last 1 Encounters:   21 160 lb 1.6 oz (72.6 kg)     Mental Status:  {IP PT MENTAL STATUS:38675}    IV Access:  { MARIN IV ACCESS:745867434}    Nursing Mobility/ADLs:  Walking   {CHP DME KXI}  Transfer  {CHP DME OOW}  Bathing  {CHP DME ZRMS:172086248}  Dressing  {CHP DME ZBVQ:518955373}  Toileting  {CHP DME XLNU:808465172}  Feeding  {CHP DME EDAO:337782460}  Med Admin  {P DME HBQK:069311348}  Med Delivery   { MARIN MED Delivery:505349843}    Wound Care Documentation and Therapy:        Elimination:  Continence:   · Bowel: {YES / SW:61289}  · Bladder: {YES / AH:76301}  Urinary Catheter: {Urinary Catheter:579617036}   Colostomy/Ileostomy/Ileal Conduit: {YES / WJ:58500}       Date of Last BM: ***    Intake/Output Summary (Last 24 hours) at 7/3/2021 1336  Last data filed at 2021 1818  Gross per 24 hour   Intake 0 ml   Output 0 ml   Net 0 ml     I/O last 3 completed shifts:   In: 100 [P.O.:100]  Out: 275 [Urine:275]    Safety Concerns:     [address] [facility] Safety Admission H&P: {CHP DME Changes in [facility]:447039991}    PHYSICIAN SIGNATURE:  {Esignature:169306214}

## 2021-07-03 NOTE — PROGRESS NOTES
Patient discharged home. avs and scripts given. Patient educated using teach back method. Patient taken to main entrance via w/c and picked up by family.

## 2021-07-03 NOTE — PROGRESS NOTES
Pt. noted with 10-11 beats V Tach for 6 seconds, assessed and VS obtained pt awake and in no distress, c/o no pain. Physcian notified per secured message, awaiting response.

## 2021-07-03 NOTE — PROGRESS NOTES
GI Progress Note      SUBJECTIVE:  Last episode of rectal bleeding took place yesterday.   Denies abdominal pain, fevers, nausea, emesis    OBJECTIVE      Medications    Current Facility-Administered Medications: ciprofloxacin (CIPRO) tablet 500 mg, 500 mg, Oral, 2 times per day  metroNIDAZOLE (FLAGYL) tablet 500 mg, 500 mg, Oral, 3 times per day  iron sucrose (VENOFER) 100 mg in sodium chloride 0.9 % 100 mL IVPB, 100 mg, Intravenous, Q24H  albuterol sulfate  (90 Base) MCG/ACT inhaler 2 puff, 2 puff, Inhalation, Q6H PRN  aspirin EC tablet 81 mg, 81 mg, Oral, Daily  carvedilol (COREG) tablet 12.5 mg, 12.5 mg, Oral, BID WC  budesonide-formoterol (SYMBICORT) 160-4.5 MCG/ACT inhaler 2 puff, 2 puff, Inhalation, BID  isosorbide mononitrate (IMDUR) extended release tablet 30 mg, 30 mg, Oral, Daily  levalbuterol (XOPENEX) nebulizer solution 0.63 mg, 0.63 mg, Nebulization, Q6H PRN  tamsulosin (FLOMAX) capsule 0.4 mg, 0.4 mg, Oral, Daily  sodium chloride flush 0.9 % injection 5-40 mL, 5-40 mL, Intravenous, 2 times per day  sodium chloride flush 0.9 % injection 5-40 mL, 5-40 mL, Intravenous, PRN  0.9 % sodium chloride infusion, 25 mL, Intravenous, PRN  ondansetron (ZOFRAN-ODT) disintegrating tablet 4 mg, 4 mg, Oral, Q8H PRN **OR** ondansetron (ZOFRAN) injection 4 mg, 4 mg, Intravenous, Q6H PRN  polyethylene glycol (GLYCOLAX) packet 17 g, 17 g, Oral, Daily PRN  acetaminophen (TYLENOL) tablet 650 mg, 650 mg, Oral, Q6H PRN **OR** acetaminophen (TYLENOL) suppository 650 mg, 650 mg, Rectal, Q6H PRN  Physical    VITALS:  BP (!) 140/96   Pulse 85   Temp 97.4 °F (36.3 °C)   Resp 16   Ht 5' 10\" (1.778 m)   Wt 160 lb 1.6 oz (72.6 kg)   SpO2 93%   BMI 22.97 kg/m²   ABD: soft, NT, ND, NABs  Data    CBC with Differential:    Lab Results   Component Value Date    WBC 7.2 07/03/2021    RBC 3.93 07/03/2021    RBC 6.61 06/09/2017    HGB 11.8 07/03/2021    HCT 35.5 07/03/2021     07/03/2021    MCV 90.4 07/03/2021    MCH 30.0 07/03/2021    MCHC 33.2 07/03/2021    RDW 17.8 07/03/2021    SEGSPCT 81.8 01/19/2013    BANDSPCT 4 04/09/2017    LYMPHOPCT 11.2 07/01/2021    LYMPHOPCT 13.1 06/09/2017    MONOPCT 6.2 07/01/2021    EOSPCT 2.3 03/31/2010    BASOPCT 1.4 07/01/2021    MONOSABS 0.6 07/01/2021    LYMPHSABS 1.0 07/01/2021    EOSABS 0.2 07/01/2021    BASOSABS 0.1 07/01/2021    DIFFTYPE Auto 01/19/2013       ASSESSMENT AND PLAN  79year old male with history of HTN, A fib, COPD, CHF, mitral valve replacement, DVT, BPH s/p TURP, Hyperthyroidism admitted with painless hematochezia likely secondary to diverticulosis. CT suggests low grade diverticulitis.   No further signs of bleeding  - OK for d/c from GI perspective  - complete course of ATB as outlined by Dr Zaira Gant  - outpatient colonoscopy

## 2021-07-03 NOTE — PROGRESS NOTES
Hospitalist Progress Note      PCP: Conchita Nielson DO    Date of Admission: 7/1/2021    Chief Complaint: Rectal bleeding    Hospital Course: This is a 23-year-old male with a history of coronary artery disease, atrial fibrillation, chronic kidney disease , hypertension, COPD, CHF, mitral valve replacement, DVT, BPH status post TURP, hyper thyroidism admitted with a hematochezia likely secondary to diverticulosis CT suggest low-grade diverticulitis GI consulted and following recommended continue antibiotic to finish 14 days course. Subjective: Patient denies any chest pain or shortness of breath no nausea vomiting no rectal bleeding since admission denies any lightheaded dizziness. Medications:  Reviewed    Infusion Medications    sodium chloride       Scheduled Medications    aspirin  81 mg Oral Daily    carvedilol  12.5 mg Oral BID WC    budesonide-formoterol  2 puff Inhalation BID    isosorbide mononitrate  30 mg Oral Daily    tamsulosin  0.4 mg Oral Daily    sodium chloride flush  5-40 mL Intravenous 2 times per day    ciprofloxacin  400 mg Intravenous Daily    metroNIDAZOLE  500 mg Intravenous TID     PRN Meds: albuterol sulfate HFA, levalbuterol, sodium chloride flush, sodium chloride, ondansetron **OR** ondansetron, polyethylene glycol, acetaminophen **OR** acetaminophen      Intake/Output Summary (Last 24 hours) at 7/3/2021 0905  Last data filed at 7/2/2021 1818  Gross per 24 hour   Intake 100 ml   Output 0 ml   Net 100 ml       Physical Exam Performed:    BP (!) 140/96   Pulse 85   Temp 97.4 °F (36.3 °C)   Resp 16   Ht 5' 10\" (1.778 m)   Wt 160 lb 1.6 oz (72.6 kg)   SpO2 93%   BMI 22.97 kg/m²     General appearance: No apparent distress, appears stated age and cooperative. HEENT: Pupils equal, round, and reactive to light. Conjunctivae/corneas clear. Neck: Supple, with full range of motion. No jugular venous distention. Trachea midline.   Respiratory:  Normal respiratory effort. Clear to auscultation, bilaterally without Rales/Wheezes/Rhonchi. Cardiovascular: Regular rate and rhythm with normal S1/S2 without murmurs, rubs or gallops. Abdomen: Soft, non-tender, non-distended with normal bowel sounds. Musculoskeletal: No clubbing, cyanosis or edema bilaterally. Full range of motion without deformity. Skin: Skin color, texture, turgor normal.  No rashes or lesions. Neurologic:  Neurovascularly intact without any focal sensory/motor deficits. Cranial nerves: II-XII intact, grossly non-focal.  Psychiatric: Alert and oriented, thought content appropriate, normal insight  Capillary Refill: Brisk,3 seconds, normal   Peripheral Pulses: +2 palpable, equal bilaterally       Labs:   Recent Labs     07/01/21  0748 07/01/21  1534 07/01/21  2121 07/02/21  0554 07/03/21  0609   WBC 9.4  --   --  7.8 7.2   HGB 15.0   < > 12.3* 12.0* 11.8*   HCT 45.3   < > 37.0* 36.2* 35.5*     --   --  213 207    < > = values in this interval not displayed. Recent Labs     07/01/21  0748 07/02/21  0553 07/03/21  0609    140 140   K 3.8 4.0 4.1    107 107   CO2 23 21 20*   BUN 53* 51* 53*   CREATININE 4.3* 4.5* 4.3*   CALCIUM 9.2 8.4 8.4   PHOS  --  4.3 4.4     Recent Labs     07/01/21  0748   AST 10*   ALT 9*   BILITOT 1.5*   ALKPHOS 142*     No results for input(s): INR in the last 72 hours. Recent Labs     07/01/21  0748   TROPONINI 0.14*       Urinalysis:      Lab Results   Component Value Date    NITRU Negative 07/01/2021    WBCUA 0-2 07/01/2021    BACTERIA 4+ 09/21/2020    RBCUA 0-2 07/01/2021    BLOODU SMALL 07/01/2021    SPECGRAV 1.015 07/01/2021    GLUCOSEU Negative 07/01/2021       Radiology:  CT ABDOMEN PELVIS WO CONTRAST Additional Contrast? None   Final Result   Suspect low-grade sigmoid diverticulitis with mild mural thickening and trace   adjacent infiltration. Right pleural effusion with moderate cardiomegaly increased from the prior   study.       Right inguinal hernia containing fat but also tethering air-filled adjacent   small bowel loop. Assessment/Plan:    Active Hospital Problems    Diagnosis     COPD (chronic obstructive pulmonary disease) ([facility] [J44.9]      Priority: High    S/P mitral valve repair [id number]      Priority: Medium    Permanent atrial fibrillation (HCC) [I48.21]      Priority: Medium    Diverticulitis [K57.92]     GI bleed [K92.2]     CKD (chronic kidney disease) [N18.9]     CHF (congestive heart failure) ([facility] [I50.9]     Hyperthyroidism [E05.90]      1. This is a 56-year-old male admitted with a bright red blood per rectum thought to be due to diverticulitis GI consulted recommended continue with the Cipro and Flagyl changed to p.o. Cipro adjust for kidney function to finish 14 days course. 2.  GI bleeding follow with serial H&H, started on IV Venofer. 3.  Acute kidney injury on chronic kidney disease stage III start on IV fluid follow with serial BMP. 4.  Hypertension continue with home medication. 5.  CHF with reduced EF 45% by echo dated December 2016.  6.  Atrial fibrillation chronic persistent status post prior MVR rate is controlled on beta-blocker no anticoagulation. 7.  Slightly elevated troponin monitor patient denies any chest pain history of coronary artery disease, check 2D echo. 8.  Hypothyroidism continue with the Synthroid. 9.  BPH continue with home medication. Notified by RN patient is adamant about discharge home today. Talk to the patient he wants to leave AMA if not discharged. Patient says he is okay if he dies at home. Patient will be discharged home to follow-up with the PCP within a week GI and nephrology as instructed,    DVT Prophylaxis: SCDs due to GI bleed  Diet: ADULT DIET;  Regular; Low Fiber  Code Status: Full Code    PT/OT Eval Status: Not indicated    Jina Mijares MD

## 2021-07-04 NOTE — DISCHARGE SUMMARY
Hospital Medicine Discharge Summary    Patient ID: Colton Castillo      Patient's PCP: Chicho Medina DO    Admit Date: 7/1/2021     Discharge Date: 7/3/2021      Admitting Physician: Martín Watt MD     Discharge Physician: Antonio Kwong MD     Discharge Diagnoses: Active Hospital Problems    Diagnosis     COPD (chronic obstructive pulmonary disease) ([facility] [J44.9]      Priority: High    S/P mitral valve repair [id number]      Priority: Medium    Permanent atrial fibrillation (HCC) [I48.21]      Priority: Medium    Diverticulitis [K57.92]     GI bleed [K92.2]     CKD (chronic kidney disease) [N18.9]     CHF (congestive heart failure) ([facility] [I50.9]     Hyperthyroidism [E05.90]        The patient was seen and examined on day of discharge and this discharge summary is in conjunction with any daily progress note from day of discharge. Hospital Course: This is a 79-year-old male with a history of coronary artery disease, atrial fibrillation, chronic kidney disease , hypertension, COPD, CHF, mitral valve replacement, DVT, BPH status post TURP, hyper thyroidism admitted with a hematochezia likely secondary to diverticulosis CT suggest low-grade diverticulitis GI consulted and following recommended continue antibiotic to finish 14 days course. 1.  This is a 79-year-old male admitted with a bright red blood per rectum thought to be due to diverticulitis GI consulted recommended continue with the Cipro and Flagyl changed to p.o. Cipro adjust for kidney function to finish 14 days course. 2.  GI bleeding follow with serial H&H, started on IV Venofer. 3.  Acute kidney injury on chronic kidney disease stage III start on IV fluid follow with serial BMP. 4.  Hypertension continue with home medication. 5.  CHF with reduced EF 45% by echo dated December 2016.  6.  Atrial fibrillation chronic persistent status post prior MVR rate is controlled on beta-blocker no anticoagulation.   7.  Slightly elevated troponin monitor patient denies any chest pain history of coronary artery disease, check 2D echo. 8.  Hypothyroidism continue with the Synthroid. 9.  BPH continue with home medication. Notified by RN patient is adamant about discharge home today. Talk to the patient he wants to leave AMA if not discharged. Patient says he is okay if he dies at home. Patient will be discharged home to follow-up with the PCP within a week GI and nephrology as instructed,      Physical Exam Performed:     /69   Pulse 62   Temp 97.5 °F (36.4 °C) (Oral)   Resp 16   Ht 5' 10\" (1.778 m)   Wt 160 lb 1.6 oz (72.6 kg)   SpO2 98%   BMI 22.97 kg/m²       General appearance:  No apparent distress, appears stated age and cooperative. HEENT:  Normal cephalic, atraumatic without obvious deformity. Pupils equal, round, and reactive to light. Extra ocular muscles intact. Conjunctivae/corneas clear. Neck: Supple, with full range of motion. No jugular venous distention. Trachea midline. Respiratory:  Normal respiratory effort. Clear to auscultation, bilaterally without Rales/Wheezes/Rhonchi. Cardiovascular:  Regular rate and rhythm with normal S1/S2 without murmurs, rubs or gallops. Abdomen: Soft, non-tender, non-distended with normal bowel sounds. Musculoskeletal:  No clubbing, cyanosis or edema bilaterally. Full range of motion without deformity. Skin: Skin color, texture, turgor normal.  No rashes or lesions. Neurologic:  Neurovascularly intact without any focal sensory/motor deficits. Cranial nerves: II-XII intact, grossly non-focal.  Psychiatric:  Alert and oriented, thought content appropriate, normal insight  Capillary Refill: Brisk,< 3 seconds   Peripheral Pulses: +2 palpable, equal bilaterally       Labs:  For convenience and continuity at follow-up the following most recent labs are provided:      CBC:    Lab Results   Component Value Date    WBC 7.2 07/03/2021    HGB 11.8 07/03/2021    HCT 35.5 07/03/2021 times daily, Disp-1 each,R-5Normal      aspirin 81 MG tablet Take 81 mg by mouth dailyHistorical Med      levalbuterol (XOPENEX) 0.63 MG/3ML nebulization Take 3 mLs by nebulization every 6 hours as needed for Wheezing, Disp-125 mL,R-5Normal      albuterol sulfate HFA (PROVENTIL HFA) 108 (90 Base) MCG/ACT inhaler Inhale 2 puffs into the lungs every 6 hours as needed for Wheezing, Disp-1 Inhaler,R-5Normal      Spacer/Aero-Holding Chambers (E-Z SPACER) MARIFER DAILY PRN Starting Tue 11/6/2018, Disp-1 Device,R-0, Normal      isosorbide mononitrate (IMDUR) 30 MG CR tablet Take 30 mg by mouth daily             Time Spent on discharge is more than 35 minutes in the examination, evaluation, counseling and review of medications and discharge plan. Signed:    Hany Pedersen MD   7/4/2021      Thank you Venu Scruggs DO for the opportunity to be involved in this patient's care. If you have any questions or concerns please feel free to contact me at [phone].

## 2021-07-06 ENCOUNTER — TELEPHONE (OUTPATIENT)
Dept: FAMILY MEDICINE CLINIC | Age: 81
End: 2021-07-06

## 2021-07-06 NOTE — TELEPHONE ENCOUNTER
[facility] Transitions Initial Follow Up Call    Outreach made within 2 business days of discharge: Yes    Patient: Azul Schaefer Patient : 1940   MRN: [id number]  Reason for Admission: There are no discharge diagnoses documented for the most recent discharge. Discharge Date: 7/3/21       Spoke with: BARON for patient to return call to office and ask for [facility] or Denisse Hassan       Scheduled appointment with PCP within 7-14 days    Follow Up  No future appointments.     Lona Manning LPN

## 2021-10-18 ENCOUNTER — TELEPHONE (OUTPATIENT)
Dept: FAMILY MEDICINE CLINIC | Age: 81
End: 2021-10-18

## 2021-10-18 NOTE — TELEPHONE ENCOUNTER
Pt. Daughter called with concerns with her dad. States he has swelling in both his feet. They are red/purplish and swollen. States when he was admitted his kidney function test were bad. Daughter concerned that his kidneys are worsening and needs him seen asap. They had to cancel today's appt. Due to it being in the morning and he is not able to get going that early. Can we use afternoon provider fill on Wed?

## 2021-10-19 ENCOUNTER — TELEPHONE (OUTPATIENT)
Dept: FAMILY MEDICINE CLINIC | Age: 81
End: 2021-10-19

## 2021-10-19 NOTE — TELEPHONE ENCOUNTER
Calling to check on status of appointment. Earliest patient could come is Thursday because a 24 hour notice for transportation has to be put in.  Please advise if you could see on Thursday

## 2021-10-21 ENCOUNTER — OFFICE VISIT (OUTPATIENT)
Dept: FAMILY MEDICINE CLINIC | Age: 81
End: 2021-10-21
Payer: MEDICARE

## 2021-10-21 DIAGNOSIS — I50.9 ACUTE ON CHRONIC CONGESTIVE HEART FAILURE, UNSPECIFIED HEART FAILURE TYPE (HCC): ICD-10-CM

## 2021-10-21 DIAGNOSIS — I50.9 ACUTE ON CHRONIC CONGESTIVE HEART FAILURE, UNSPECIFIED HEART FAILURE TYPE (HCC): Primary | ICD-10-CM

## 2021-10-21 DIAGNOSIS — N18.9 ACUTE KIDNEY INJURY SUPERIMPOSED ON CHRONIC KIDNEY DISEASE (HCC): ICD-10-CM

## 2021-10-21 DIAGNOSIS — N17.9 ACUTE KIDNEY INJURY SUPERIMPOSED ON CHRONIC KIDNEY DISEASE (HCC): ICD-10-CM

## 2021-10-21 DIAGNOSIS — N17.9 AKI (ACUTE KIDNEY INJURY) (HCC): ICD-10-CM

## 2021-10-21 LAB
BASOPHILS ABSOLUTE: 0.3 K/UL (ref 0–0.2)
BASOPHILS RELATIVE PERCENT: 3.8 %
EOSINOPHILS ABSOLUTE: 0.1 K/UL (ref 0–0.6)
EOSINOPHILS RELATIVE PERCENT: 1.5 %
HCT VFR BLD CALC: 47.6 % (ref 40.5–52.5)
HEMOGLOBIN: 14.9 G/DL (ref 13.5–17.5)
LYMPHOCYTES ABSOLUTE: 0.5 K/UL (ref 1–5.1)
LYMPHOCYTES RELATIVE PERCENT: 6.2 %
MCH RBC QN AUTO: 26.9 PG (ref 26–34)
MCHC RBC AUTO-ENTMCNC: 31.3 G/DL (ref 31–36)
MCV RBC AUTO: 85.8 FL (ref 80–100)
MONOCYTES ABSOLUTE: 0.6 K/UL (ref 0–1.3)
MONOCYTES RELATIVE PERCENT: 7.1 %
NEUTROPHILS ABSOLUTE: 7.2 K/UL (ref 1.7–7.7)
NEUTROPHILS RELATIVE PERCENT: 81.4 %
PDW BLD-RTO: 16.3 % (ref 12.4–15.4)
PLATELET # BLD: 168 K/UL (ref 135–450)
PMV BLD AUTO: 9.2 FL (ref 5–10.5)
RBC # BLD: 5.56 M/UL (ref 4.2–5.9)
WBC # BLD: 8.9 K/UL (ref 4–11)

## 2021-10-21 PROCEDURE — G8484 FLU IMMUNIZE NO ADMIN: HCPCS | Performed by: STUDENT IN AN ORGANIZED HEALTH CARE EDUCATION/TRAINING PROGRAM

## 2021-10-21 PROCEDURE — G8427 DOCREV CUR MEDS BY ELIG CLIN: HCPCS | Performed by: STUDENT IN AN ORGANIZED HEALTH CARE EDUCATION/TRAINING PROGRAM

## 2021-10-21 PROCEDURE — 99215 OFFICE O/P EST HI 40 MIN: CPT | Performed by: STUDENT IN AN ORGANIZED HEALTH CARE EDUCATION/TRAINING PROGRAM

## 2021-10-21 PROCEDURE — 1036F TOBACCO NON-USER: CPT | Performed by: STUDENT IN AN ORGANIZED HEALTH CARE EDUCATION/TRAINING PROGRAM

## 2021-10-21 PROCEDURE — G8420 CALC BMI NORM PARAMETERS: HCPCS | Performed by: STUDENT IN AN ORGANIZED HEALTH CARE EDUCATION/TRAINING PROGRAM

## 2021-10-21 PROCEDURE — 4040F PNEUMOC VAC/ADMIN/RCVD: CPT | Performed by: STUDENT IN AN ORGANIZED HEALTH CARE EDUCATION/TRAINING PROGRAM

## 2021-10-21 PROCEDURE — 1123F ACP DISCUSS/DSCN MKR DOCD: CPT | Performed by: STUDENT IN AN ORGANIZED HEALTH CARE EDUCATION/TRAINING PROGRAM

## 2021-10-21 RX ORDER — FUROSEMIDE 40 MG/1
40 TABLET ORAL 2 TIMES DAILY
Qty: 30 TABLET | Refills: 0 | Status: SHIPPED | OUTPATIENT
Start: 2021-10-21 | End: 2021-10-28

## 2021-10-21 SDOH — ECONOMIC STABILITY: HOUSING INSECURITY: IN THE LAST 12 MONTHS, HOW MANY PLACES HAVE YOU LIVED?: 1

## 2021-10-21 SDOH — ECONOMIC STABILITY: FOOD INSECURITY: WITHIN THE PAST 12 MONTHS, THE FOOD YOU BOUGHT JUST DIDN'T LAST AND YOU DIDN'T HAVE MONEY TO GET MORE.: NEVER TRUE

## 2021-10-21 SDOH — ECONOMIC STABILITY: INCOME INSECURITY: IN THE LAST 12 MONTHS, WAS THERE A TIME WHEN YOU WERE NOT ABLE TO PAY THE MORTGAGE OR RENT ON TIME?: NO

## 2021-10-21 SDOH — ECONOMIC STABILITY: TRANSPORTATION INSECURITY
IN THE PAST 12 MONTHS, HAS THE LACK OF TRANSPORTATION KEPT YOU FROM MEDICAL APPOINTMENTS OR FROM GETTING MEDICATIONS?: NO

## 2021-10-21 SDOH — ECONOMIC STABILITY: FOOD INSECURITY: WITHIN THE PAST 12 MONTHS, YOU WORRIED THAT YOUR FOOD WOULD RUN OUT BEFORE YOU GOT MONEY TO BUY MORE.: NEVER TRUE

## 2021-10-21 SDOH — ECONOMIC STABILITY: TRANSPORTATION INSECURITY
IN THE PAST 12 MONTHS, HAS LACK OF TRANSPORTATION KEPT YOU FROM MEETINGS, WORK, OR FROM GETTING THINGS NEEDED FOR DAILY LIVING?: NO

## 2021-10-21 SDOH — ECONOMIC STABILITY: HOUSING INSECURITY
IN THE LAST 12 MONTHS, WAS THERE A TIME WHEN YOU DID NOT HAVE A STEADY PLACE TO SLEEP OR SLEPT IN A SHELTER (INCLUDING NOW)?: NO

## 2021-10-21 ASSESSMENT — PATIENT HEALTH QUESTIONNAIRE - PHQ9
SUM OF ALL RESPONSES TO PHQ QUESTIONS 1-9: 0
SUM OF ALL RESPONSES TO PHQ9 QUESTIONS 1 & 2: 0
SUM OF ALL RESPONSES TO PHQ QUESTIONS 1-9: 0
2. FEELING DOWN, DEPRESSED OR HOPELESS: 0
SUM OF ALL RESPONSES TO PHQ QUESTIONS 1-9: 0
1. LITTLE INTEREST OR PLEASURE IN DOING THINGS: 0

## 2021-10-21 ASSESSMENT — SOCIAL DETERMINANTS OF HEALTH (SDOH): HOW HARD IS IT FOR YOU TO PAY FOR THE VERY BASICS LIKE FOOD, HOUSING, MEDICAL CARE, AND HEATING?: NOT HARD AT ALL

## 2021-10-21 NOTE — PROGRESS NOTES
Patient: Vasiliy Hutchins is a 79 y.o. male who presents today with the following Chief Complaint(s):  Chief Complaint   Patient presents with    Leg Swelling     Patient has chronic leg swelling however over the past month they have stayed swollen despite medication and he cannot lay down because he feels like someone is sitting on his chest. Legs are discolored          HPI     Patient last seen 1 year ago. At that time found to have acute heart failure and CKD5. Daughter then reports after he started feeling better he did not wish to return for evaluation. Reports that he has now been ooff of his heart failure medications for 6 months except for his lasix which he reports now having minimal effect. Reports progressive shortness of breath over this period but severly limited over last month and unable to walk in yard or perform other routine activities. Now sleeping in recliner due to inability to lay flat due to shortness of breath and chest pressure. Significant bilateral LE edema and pain. Reports he has also started having minimal urine output. Has not previously seen nephrologist.     States that he does not wish to have any invasive procedures, would decline dialysis, does not wish to be hospitalized.      Current Outpatient Medications   Medication Sig Dispense Refill    furosemide (LASIX) 40 MG tablet Take 1 tablet by mouth 2 times daily for 7 days 30 tablet 0    tamsulosin (FLOMAX) 0.4 MG capsule Take 1 capsule by mouth daily 30 capsule 1    albuterol sulfate HFA (PROVENTIL HFA) 108 (90 Base) MCG/ACT inhaler Inhale 2 puffs into the lungs every 6 hours as needed for Wheezing 1 Inhaler 5    carvedilol (COREG) 12.5 MG tablet Take 1 tablet by mouth 2 times daily (with meals) 60 tablet 1    fluticasone-umeclidin-vilant (TRELEGY ELLIPTA) 100-62.5-25 MCG/INH AEPB Inhale 1 puff into the lungs daily 1 each 0    Umeclidinium Bromide 62.5 MCG/INH AEPB Inhale 62.5 mcg into the lungs daily 30 each 1    fluticasone-salmeterol (ADVAIR DISKUS) 250-50 MCG/DOSE AEPB Inhale 1 puff into the lungs 2 times daily 1 each 5    aspirin 81 MG tablet Take 81 mg by mouth daily      levalbuterol (XOPENEX) 0.63 MG/3ML nebulization Take 3 mLs by nebulization every 6 hours as needed for Wheezing 125 mL 5    Spacer/Aero-Holding Chambers (E-Z SPACER) MARIFER 1 Device by Does not apply route daily as needed (prn with Albuterol) 1 Device 0    isosorbide mononitrate (IMDUR) 30 MG CR tablet Take 30 mg by mouth daily       No current facility-administered medications for this visit. Patient's past medical history, surgical history, family history, medications,  andallergies  were all reviewed and updated as appropriate today. Review of Systems  All other systems reviewed and negative    Physical Exam  Vitals reviewed. Cardiovascular:      Rate and Rhythm: Normal rate and regular rhythm. Heart sounds: No murmur heard. No friction rub. Pulmonary:      Breath sounds: No wheezing or rhonchi. Comments: Significant shortness of breath with conversation. Unable to tolerate any recline on examination table. Breath sounds decreased in bilateral bases  Abdominal:      General: There is no distension. Skin:     Comments: Bilateral LE with tense edema and erythema. Superficial scratches to right LE (from briar patch). Neurological:      General: No focal deficit present. Mental Status: He is alert and oriented to person, place, and time. Psychiatric:         Mood and Affect: Mood normal.         Behavior: Behavior normal.       Vitals:    10/21/21 1138   BP: 122/89   Pulse: 56   SpO2: 96%       Assessment:  Encounter Diagnoses   Name Primary?  Acute on chronic congestive heart failure, unspecified heart failure type ([facility] Yes    SHALINI (acute kidney injury) ([facility]     Acute kidney injury superimposed on chronic kidney disease ([facility]        Plan:  1.  Acute on chronic congestive heart failure, unspecified heart failure type [facility])  Long discussion with daughter and patient regarding goals of care and prognosis. Discussed that his untreated kidney failure and poorly controlled heart failure would be best managed in the hospital given severity of decompensation and concern for kidney injury with outpatient diuretics. Also discussed concern that this could lead to acute renal failure with reports of already decreased urinary output and if patient declining dialysis this would be fatal. Discussed options of treatment with diuretics vs referral to hospice if patient wishing to pursue a more comfort care strategy of treatment. Patient and daughter will start with 40mg of lasix and call with report of urinary output. If minimal output can attempt increase to 80 mg. Will hold on restarting BB at this time given significant decompensation.   - Renal Function Panel; Future  - BRAIN NATRIURETIC PEPTIDE (BNP); Future  - CBC WITH AUTO DIFFERENTIAL; Future  - furosemide (LASIX) 40 MG tablet; Take 1 tablet by mouth 2 times daily for 7 days  Dispense: 30 tablet; Refill: 0    2. SHALINI (acute kidney injury) ([facility]  3. Acute kidney injury superimposed on chronic kidney disease ([facility]  Risk discussion including potentially fatal kidney injury as discussed above. Will recheck labs today and attempt to get patient established with nephrologist.  - Delphine Arango MD, Nephrology, [facility]  - furosemide (LASIX) 40 MG tablet; Take 1 tablet by mouth 2 times daily for 7 days  Dispense: 30 tablet; Refill: 0        No follow-ups on file.

## 2021-10-22 ENCOUNTER — TELEPHONE (OUTPATIENT)
Dept: FAMILY MEDICINE CLINIC | Age: 81
End: 2021-10-22

## 2021-10-22 VITALS
DIASTOLIC BLOOD PRESSURE: 89 MMHG | BODY MASS INDEX: 24.68 KG/M2 | HEART RATE: 56 BPM | WEIGHT: 172 LBS | OXYGEN SATURATION: 96 % | SYSTOLIC BLOOD PRESSURE: 122 MMHG

## 2021-10-22 LAB
ALBUMIN SERPL-MCNC: 3.9 G/DL (ref 3.4–5)
ANION GAP SERPL CALCULATED.3IONS-SCNC: 21 MMOL/L (ref 3–16)
BUN BLDV-MCNC: 53 MG/DL (ref 7–20)
CALCIUM SERPL-MCNC: 9.2 MG/DL (ref 8.3–10.6)
CHLORIDE BLD-SCNC: 100 MMOL/L (ref 99–110)
CO2: 22 MMOL/L (ref 21–32)
CREAT SERPL-MCNC: 4.7 MG/DL (ref 0.8–1.3)
GFR AFRICAN AMERICAN: 15
GFR NON-AFRICAN AMERICAN: 12
GLUCOSE BLD-MCNC: 73 MG/DL (ref 70–99)
PHOSPHORUS: 4.1 MG/DL (ref 2.5–4.9)
POTASSIUM SERPL-SCNC: 4.2 MMOL/L (ref 3.5–5.1)
PRO-BNP: ABNORMAL PG/ML (ref 0–449)
SODIUM BLD-SCNC: 143 MMOL/L (ref 136–145)

## 2021-10-22 NOTE — TELEPHONE ENCOUNTER
Mario Powell daughter on hippa calling to report how her father is doing with the increase in Lasix. She reports his Lasix was increased to 40 mg bid and was to report back if he was producing anymore urine in which she reports he is. She has not done the daily weights but reports she will try over the weekend.

## 2021-10-25 ENCOUNTER — TELEPHONE (OUTPATIENT)
Dept: FAMILY MEDICINE CLINIC | Age: 81
End: 2021-10-25

## 2021-10-25 NOTE — TELEPHONE ENCOUNTER
Tay Dial ( Pts daughter ) calling asking the status of the [place] paperwork that pt had at the 10/21 apt.  Tay Dial is asking if the forms can be faxed to [phone]

## 2021-10-27 ENCOUNTER — TELEPHONE (OUTPATIENT)
Dept: FAMILY MEDICINE CLINIC | Age: 81
End: 2021-10-27

## 2021-10-27 NOTE — TELEPHONE ENCOUNTER
Called daughter to discuss symptoms and response to therapy. Reports that patient has been making very minimal urine for last 48 hours and now increasing in weight. Reports they advised him to go to the ED last night but that patient refused at that time. Discussed recommendation that patient be evaluated in the hospital due to concern for acute kidney failure and possible need for at least temporary dialysis. Also needs to be evaluated for possible urinary obstruction with history BPH. Daughter will attempt to convince him today and if not will follow up in office tomorrow. Understands risks involved if he declines hospital evaluation including death.

## 2021-10-27 NOTE — TELEPHONE ENCOUNTER
Started increased dose of Lasix on 10-. The First 3 days his urine output increased and he lost a few pounds. Since this Monday  He is barely passing any urine and is extremely swollen and has gained weight. His legs are so swollen they are seeping fluid. Pt. Is uncomfortable. Please advise.

## 2021-10-28 ENCOUNTER — OFFICE VISIT (OUTPATIENT)
Dept: FAMILY MEDICINE CLINIC | Age: 81
End: 2021-10-28
Payer: MEDICARE

## 2021-10-28 VITALS
DIASTOLIC BLOOD PRESSURE: 66 MMHG | SYSTOLIC BLOOD PRESSURE: 136 MMHG | HEART RATE: 71 BPM | BODY MASS INDEX: 24.68 KG/M2 | WEIGHT: 172 LBS | OXYGEN SATURATION: 98 %

## 2021-10-28 DIAGNOSIS — N17.9 ACUTE KIDNEY INJURY SUPERIMPOSED ON CHRONIC KIDNEY DISEASE (HCC): ICD-10-CM

## 2021-10-28 DIAGNOSIS — I50.9 ACUTE ON CHRONIC CONGESTIVE HEART FAILURE, UNSPECIFIED HEART FAILURE TYPE (HCC): Primary | ICD-10-CM

## 2021-10-28 DIAGNOSIS — N18.9 ACUTE KIDNEY INJURY SUPERIMPOSED ON CHRONIC KIDNEY DISEASE (HCC): ICD-10-CM

## 2021-10-28 PROCEDURE — G8420 CALC BMI NORM PARAMETERS: HCPCS | Performed by: STUDENT IN AN ORGANIZED HEALTH CARE EDUCATION/TRAINING PROGRAM

## 2021-10-28 PROCEDURE — 99214 OFFICE O/P EST MOD 30 MIN: CPT | Performed by: STUDENT IN AN ORGANIZED HEALTH CARE EDUCATION/TRAINING PROGRAM

## 2021-10-28 PROCEDURE — 1036F TOBACCO NON-USER: CPT | Performed by: STUDENT IN AN ORGANIZED HEALTH CARE EDUCATION/TRAINING PROGRAM

## 2021-10-28 PROCEDURE — G8484 FLU IMMUNIZE NO ADMIN: HCPCS | Performed by: STUDENT IN AN ORGANIZED HEALTH CARE EDUCATION/TRAINING PROGRAM

## 2021-10-28 PROCEDURE — G8427 DOCREV CUR MEDS BY ELIG CLIN: HCPCS | Performed by: STUDENT IN AN ORGANIZED HEALTH CARE EDUCATION/TRAINING PROGRAM

## 2021-10-28 PROCEDURE — 4040F PNEUMOC VAC/ADMIN/RCVD: CPT | Performed by: STUDENT IN AN ORGANIZED HEALTH CARE EDUCATION/TRAINING PROGRAM

## 2021-10-28 PROCEDURE — 1123F ACP DISCUSS/DSCN MKR DOCD: CPT | Performed by: STUDENT IN AN ORGANIZED HEALTH CARE EDUCATION/TRAINING PROGRAM

## 2021-10-28 NOTE — PROGRESS NOTES
Patient: Jerrod Bell is a 79 y.o. male who presents today with the following Chief Complaint(s):  Chief Complaint   Patient presents with   Frank Colón is here with his daughters for his legs that are seeping and extremly swolen right leg is worse then left and is still seeping     Cough     Patient has issues breathing when lying down and coughing while doing so          HPI     Patient following up today on acute heart failure exacerbation as well as ESRD. Reports that he took Lasix as prescribed with small increase in urine output for the first few days however after that noticed no change. On Sunday had dizziness episode where he fell out of bed and did not take Lasix at all that day. Yesterday reports that he took Lasix 3 times daily with increased urination that evening although also had large milkshake. Reports mild improvement in breathing today however still having weeping edema in bilateral lower extremity as well as severe orthopnea. Moderately short of breath sitting up and conversing.     Current Outpatient Medications   Medication Sig Dispense Refill    furosemide (LASIX) 40 MG tablet Take 1 tablet by mouth 2 times daily for 7 days 30 tablet 0    carvedilol (COREG) 12.5 MG tablet Take 1 tablet by mouth 2 times daily (with meals) 60 tablet 1    tamsulosin (FLOMAX) 0.4 MG capsule Take 1 capsule by mouth daily 30 capsule 1    fluticasone-umeclidin-vilant (TRELEGY ELLIPTA) 100-62.5-25 MCG/INH AEPB Inhale 1 puff into the lungs daily 1 each 0    Umeclidinium Bromide 62.5 MCG/INH AEPB Inhale 62.5 mcg into the lungs daily 30 each 1    fluticasone-salmeterol (ADVAIR DISKUS) 250-50 MCG/DOSE AEPB Inhale 1 puff into the lungs 2 times daily 1 each 5    aspirin 81 MG tablet Take 81 mg by mouth daily      levalbuterol (XOPENEX) 0.63 MG/3ML nebulization Take 3 mLs by nebulization every 6 hours as needed for Wheezing 125 mL 5    albuterol sulfate HFA (PROVENTIL HFA) 108 (90 Base) MCG/ACT inhaler Inhale 2 puffs into the lungs every 6 hours as needed for Wheezing 1 Inhaler 5    Spacer/Aero-Holding Chambers (E-Z SPACER) MARIFER 1 Device by Does not apply route daily as needed (prn with Albuterol) 1 Device 0    isosorbide mononitrate (IMDUR) 30 MG CR tablet Take 30 mg by mouth daily       No current facility-administered medications for this visit. Patient's past medical history, surgical history, family history, medications,  andallergies  were all reviewed and updated as appropriate today. Review of Systems  All other systems reviewed and negative    Physical Exam  Constitutional:       Appearance: He is ill-appearing. Comments: Short of breath with conversation   Skin:     Comments: Weeping tense edema of bilateral LE   Neurological:      General: No focal deficit present. Mental Status: He is alert and oriented to person, place, and time. Psychiatric:         Mood and Affect: Mood normal.         Behavior: Behavior normal.         Thought Content: Thought content normal.         Judgment: Judgment normal.       Vitals:    10/28/21 1140   BP: 136/66   Pulse: 71   SpO2: 98%       Assessment:  Encounter Diagnoses   Name Primary?  Acute on chronic congestive heart failure, unspecified heart failure type ([address] Yes    Acute kidney injury superimposed on chronic kidney disease ([address]        Plan:  1. Acute on chronic congestive heart failure, unspecified heart failure type ([address]  2. Acute kidney injury superimposed on chronic kidney disease ([address]  Long conversation with both patient and his daughters who were present during visit today regarding treatment options and prognosis going forward. Patient agreeable to go to [facility] for evaluation and likely admission. Called and gave sign out to [id number] [facility] ED department regarding patient. No follow-ups on file.

## 2021-11-09 ENCOUNTER — HOSPITAL ENCOUNTER (OUTPATIENT)
Age: 81
Setting detail: SPECIMEN
Discharge: HOME | End: 2021-11-09
Payer: MEDICARE

## 2021-11-09 ENCOUNTER — TELEPHONE (OUTPATIENT)
Dept: FAMILY MEDICINE CLINIC | Age: 81
End: 2021-11-09

## 2021-11-09 LAB
ANION GAP SERPL CALCULATED.3IONS-SCNC: 16 MMOL/L (ref 3–16)
BUN BLDV-MCNC: 74 MG/DL (ref 7–20)
CALCIUM SERPL-MCNC: 9.5 MG/DL (ref 8.3–10.6)
CHLORIDE BLD-SCNC: 95 MMOL/L (ref 99–110)
CO2: 26 MMOL/L (ref 21–32)
CREAT SERPL-MCNC: 5.4 MG/DL (ref 0.8–1.3)
GFR AFRICAN AMERICAN: 12
GFR NON-AFRICAN AMERICAN: 10
GLUCOSE BLD-MCNC: 75 MG/DL (ref 70–99)
POTASSIUM SERPL-SCNC: 5.6 MMOL/L (ref 3.5–5.1)
SODIUM BLD-SCNC: 137 MMOL/L (ref 136–145)

## 2021-11-09 PROCEDURE — 80048 BASIC METABOLIC PNL TOTAL CA: CPT

## 2021-11-09 NOTE — TELEPHONE ENCOUNTER
Spoke with pt regarding he is due for his medicare annual wellness visit and pt stated that he has no way of getting into the office to have this completed and that he stated he has a visiting nurse that is coming to his house daily

## 2021-11-10 ENCOUNTER — TELEPHONE (OUTPATIENT)
Dept: FAMILY MEDICINE CLINIC | Age: 81
End: 2021-11-10

## 2021-11-10 NOTE — TELEPHONE ENCOUNTER
The Lab called to bring attention to [facility] Critical Creatinine levels from yesterday. I called Mario Powell About another subject this morning and Got her . Her  will inform her that she needs to contact the office when she get off of work.

## 2021-11-10 NOTE — TELEPHONE ENCOUNTER
Called daughter to discuss recent lab work showing elevated potassium as well as worsening kidney function. Patient currently involved in hospice care with home health and palliative care NP planning to establish and follow. Discussed risks of worsening hyperkalemia with risk of fatal arrhythmia. Recommended holding Lasix if weight stable and instead dosing as needed for increased weight gain of 3 pounds or swelling causing discomfort. Discussed that this can cause worsening hyperkalemia but spare kidney function. Discussed need for nephrology if pursuing more aggressive management as opposed to comfort treatment. Breathing spontaneous and unlabored. Breath sounds clear and equal bilaterally with regular rhythm.

## 2021-11-30 ENCOUNTER — TELEPHONE (OUTPATIENT)
Dept: FAMILY MEDICINE CLINIC | Age: 81
End: 2021-11-30

## 2021-11-30 NOTE — TELEPHONE ENCOUNTER
Manjeet Smith says that the patients respiration is 33 at rest and he cannot sleep. It is their desire to give this medication to decrease his respiration so that he has some relief and can sleep. [facility] Has been notified And agrees with trying the medication. This plan is the only one Mr. Santillan Has Agreed to because he refuses to go into hospice care.

## 2021-11-30 NOTE — TELEPHONE ENCOUNTER
This is a higher dose than I feel comfortable prescribing if patient is not interested in hospice care.  If family would like to discuss starting this medication with the discussion that this can decrease respiratory drive and lead to respiratory compromise then could consider

## 2021-11-30 NOTE — TELEPHONE ENCOUNTER
Cody with [facility] calling requesting some orders for this patient. Patient was recently discharged from hospital and it was recommended he transfer to hospice but patient declined he was not ready. He is being seen at home with [facility]. Cody reports patient has severe SOB,COPD,CHF and PAD. His legs from the knee down are blue and painful. Orders requested are Roxanol liquid 5 mg every 6 hours may increase up to 10 mg every 6 hours if needed. Home care will fill the syringes and do the teaching. For patients anxiety and to help with breathing she is requesting a small dose of Ativan as he is not ready to accept and go into Hospice Care at this time. Cody may be reached at [phone] 24/7.

## 2021-12-02 ENCOUNTER — TELEPHONE (OUTPATIENT)
Dept: FAMILY MEDICINE CLINIC | Age: 81
End: 2021-12-02

## 2021-12-02 DIAGNOSIS — G25.81 RLS (RESTLESS LEGS SYNDROME): Primary | ICD-10-CM

## 2021-12-02 RX ORDER — GABAPENTIN 300 MG/1
300 CAPSULE ORAL NIGHTLY
Qty: 30 CAPSULE | Refills: 0 | Status: SHIPPED | OUTPATIENT
Start: 2021-12-02 | End: 2022-01-01

## 2021-12-02 NOTE — TELEPHONE ENCOUNTER
Called and spoke with patient's daughter regarding request for medications. As well as an update on patient's status. Daughter reports that home health care has been coming to home and this is significantly improved the wounds in his legs. Also had consultation with hospice after last admission and decided to decline hospice and continue with home health care. Reports the biggest concern right now is constant twitching/jerking of his legs that is most noticeable at night and keeping him from sleeping. Reports some pain when walking on his legs. Discussed concerns around high-dose morphine as previously concerned and respiratory compromise that could occur. Daughter in agreement and would like to try alternative treatment. Will trial gabapentin once nightly to help with restless leg as well as sleep. Will report back with any changes.

## 2021-12-02 NOTE — TELEPHONE ENCOUNTER
Patricia Galvan pts daughter calling asking if Dr. Britany Lima would have any other suggestions in medications to help pts \" leg situation\" Please Advise [phone]

## 2022-01-03 ENCOUNTER — TELEPHONE (OUTPATIENT)
Dept: FAMILY MEDICINE CLINIC | Age: 82
End: 2022-01-03

## 2022-01-03 NOTE — TELEPHONE ENCOUNTER
[facility])  Reporting they are re-certifying him for weekly nursing care. Also, he has a vascular ulcer on her right leg due to water being excreted from his leg. She will use [facility] for treatment. She states he is complaining of increased pain in his legs and is asking if you will order something for pain.

## 2022-01-03 NOTE — TELEPHONE ENCOUNTER
I spoke with Angelia Mora on the phone and he stated that he does not want to make an appointment for this at this time. He stated he will call us back to schedule.

## 2022-02-22 RX ORDER — TAMSULOSIN HYDROCHLORIDE 0.4 MG/1
0.4 CAPSULE ORAL DAILY
Qty: 90 CAPSULE | Refills: 1 | Status: SHIPPED | OUTPATIENT
Start: 2022-02-22

## 2022-02-22 NOTE — TELEPHONE ENCOUNTER
Refill Request     Last Seen: Last Seen Department: 10/28/2021  Last Seen by PCP: 10/28/2021    Last Written: 09/23/2020    Next Appointment:   No future appointments. Patient in palliative care.        Requested Prescriptions     Pending Prescriptions Disp Refills    tamsulosin (FLOMAX) 0.4 MG capsule 30 capsule 1     Sig: Take 1 capsule by mouth daily

## 2022-03-01 ENCOUNTER — TELEPHONE (OUTPATIENT)
Dept: FAMILY MEDICINE CLINIC | Age: 82
End: 2022-03-01

## 2022-03-01 NOTE — TELEPHONE ENCOUNTER
Desiree from home care called and states she is resuming home care and weekly nurse visits. States his weight is increased 4lbs, with increased SOB, right lower lobe has some crackling. He is supposed to be on lasix 2 times daily, but has only been taking it once a day because it makes him urinate a lot.     Desiree [phone]

## 2022-03-02 NOTE — TELEPHONE ENCOUNTER
Desiree has been informed and states she is also discussing hospice with [facility] and his daughters for better understanding of what hospice is so that they can make a better educated decision. She states that she will keep us up to date on any changes.

## 2022-03-02 NOTE — TELEPHONE ENCOUNTER
Recommend continued weight monitoring and restarting BID dosing.  If not improving would recommend evaluation

## 2022-04-12 ENCOUNTER — TELEPHONE (OUTPATIENT)
Dept: FAMILY MEDICINE CLINIC | Age: 82
End: 2022-04-12

## 2022-04-12 NOTE — TELEPHONE ENCOUNTER
Home care nurse Elijah martinez stated pt BP is elevated 189/98 after 5/10 minutes 165/93   Pt is experiencing sx of leg weakness /SOB/ blurred vision x few weeks   Pt is not currently on any BP medications but [place]. No head trauma.  Please advise

## 2022-04-13 NOTE — TELEPHONE ENCOUNTER
Is patient still enrolled in hospice care? If in hospice then would follow up with that provider.  If no longer in hospice then needs appointment

## 2022-07-12 ENCOUNTER — HOSPITAL ENCOUNTER (EMERGENCY)
Age: 82
Discharge: HOME | End: 2022-07-13
Attending: EMERGENCY MEDICINE
Payer: MEDICARE

## 2022-07-12 ENCOUNTER — APPOINTMENT (OUTPATIENT)
Dept: GENERAL RADIOLOGY | Age: 82
End: 2022-07-12
Payer: MEDICARE

## 2022-07-12 DIAGNOSIS — L08.9 LEG ABRASION, INFECTED, RIGHT, INITIAL ENCOUNTER: ICD-10-CM

## 2022-07-12 DIAGNOSIS — S80.811A LEG ABRASION, INFECTED, RIGHT, INITIAL ENCOUNTER: ICD-10-CM

## 2022-07-12 DIAGNOSIS — L03.115 CELLULITIS OF RIGHT LEG: Primary | ICD-10-CM

## 2022-07-12 DIAGNOSIS — N18.9 CHRONIC KIDNEY DISEASE, UNSPECIFIED CKD STAGE: ICD-10-CM

## 2022-07-12 LAB
A/G RATIO: 1.1 (ref 1.1–2.2)
ALBUMIN SERPL-MCNC: 4.1 G/DL (ref 3.4–5)
ALP BLD-CCNC: 231 U/L (ref 40–129)
ALT SERPL-CCNC: 6 U/L (ref 10–40)
ANION GAP SERPL CALCULATED.3IONS-SCNC: 19 MMOL/L (ref 3–16)
AST SERPL-CCNC: 12 U/L (ref 15–37)
BASOPHILS ABSOLUTE: 0.1 K/UL (ref 0–0.2)
BASOPHILS RELATIVE PERCENT: 1.1 %
BILIRUB SERPL-MCNC: 1.9 MG/DL (ref 0–1)
BUN BLDV-MCNC: 62 MG/DL (ref 7–20)
CALCIUM SERPL-MCNC: 9.3 MG/DL (ref 8.3–10.6)
CHLORIDE BLD-SCNC: 100 MMOL/L (ref 99–110)
CO2: 21 MMOL/L (ref 21–32)
CREAT SERPL-MCNC: 4.3 MG/DL (ref 0.8–1.3)
EOSINOPHILS ABSOLUTE: 0.1 K/UL (ref 0–0.6)
EOSINOPHILS RELATIVE PERCENT: 1.4 %
GFR AFRICAN AMERICAN: 16
GFR NON-AFRICAN AMERICAN: 13
GLUCOSE BLD-MCNC: 104 MG/DL (ref 70–99)
HCT VFR BLD CALC: 50.9 % (ref 40.5–52.5)
HEMOGLOBIN: 16.6 G/DL (ref 13.5–17.5)
LACTIC ACID, SEPSIS: 1.1 MMOL/L (ref 0.4–1.9)
LYMPHOCYTES ABSOLUTE: 0.8 K/UL (ref 1–5.1)
LYMPHOCYTES RELATIVE PERCENT: 7.3 %
MCH RBC QN AUTO: 28.3 PG (ref 26–34)
MCHC RBC AUTO-ENTMCNC: 32.7 G/DL (ref 31–36)
MCV RBC AUTO: 86.7 FL (ref 80–100)
MONOCYTES ABSOLUTE: 0.9 K/UL (ref 0–1.3)
MONOCYTES RELATIVE PERCENT: 8.4 %
NEUTROPHILS ABSOLUTE: 8.5 K/UL (ref 1.7–7.7)
NEUTROPHILS RELATIVE PERCENT: 81.8 %
PDW BLD-RTO: 16.5 % (ref 12.4–15.4)
PLATELET # BLD: 248 K/UL (ref 135–450)
PMV BLD AUTO: 8.2 FL (ref 5–10.5)
POTASSIUM REFLEX MAGNESIUM: 4.7 MMOL/L (ref 3.5–5.1)
RBC # BLD: 5.87 M/UL (ref 4.2–5.9)
SODIUM BLD-SCNC: 140 MMOL/L (ref 136–145)
TOTAL CK: 48 U/L (ref 39–308)
TOTAL PROTEIN: 8 G/DL (ref 6.4–8.2)
WBC # BLD: 10.4 K/UL (ref 4–11)

## 2022-07-12 PROCEDURE — 96365 THER/PROPH/DIAG IV INF INIT: CPT

## 2022-07-12 PROCEDURE — 73590 X-RAY EXAM OF LOWER LEG: CPT

## 2022-07-12 PROCEDURE — 87070 CULTURE OTHR SPECIMN AEROBIC: CPT

## 2022-07-12 PROCEDURE — 82550 ASSAY OF CK (CPK): CPT

## 2022-07-12 PROCEDURE — 83605 ASSAY OF LACTIC ACID: CPT

## 2022-07-12 PROCEDURE — 87205 SMEAR GRAM STAIN: CPT

## 2022-07-12 PROCEDURE — 99284 EMERGENCY DEPT VISIT MOD MDM: CPT

## 2022-07-12 PROCEDURE — 96368 THER/DIAG CONCURRENT INF: CPT

## 2022-07-12 PROCEDURE — 87040 BLOOD CULTURE FOR BACTERIA: CPT

## 2022-07-12 PROCEDURE — 6370000000 HC RX 637 (ALT 250 FOR IP): Performed by: EMERGENCY MEDICINE

## 2022-07-12 PROCEDURE — 2580000003 HC RX 258: Performed by: EMERGENCY MEDICINE

## 2022-07-12 PROCEDURE — 80053 COMPREHEN METABOLIC PANEL: CPT

## 2022-07-12 PROCEDURE — 6360000002 HC RX W HCPCS: Performed by: EMERGENCY MEDICINE

## 2022-07-12 PROCEDURE — 36415 COLL VENOUS BLD VENIPUNCTURE: CPT

## 2022-07-12 PROCEDURE — 85025 COMPLETE CBC W/AUTO DIFF WBC: CPT

## 2022-07-12 RX ORDER — OXYCODONE HYDROCHLORIDE AND ACETAMINOPHEN 5; 325 MG/1; MG/1
1 TABLET ORAL ONCE
Status: COMPLETED | OUTPATIENT
Start: 2022-07-12 | End: 2022-07-12

## 2022-07-12 RX ADMIN — OXYCODONE HYDROCHLORIDE AND ACETAMINOPHEN 1 TABLET: 5; 325 TABLET ORAL at 21:30

## 2022-07-12 RX ADMIN — VANCOMYCIN HYDROCHLORIDE 1000 MG: 1 INJECTION, POWDER, LYOPHILIZED, FOR SOLUTION INTRAVENOUS at 22:53

## 2022-07-12 RX ADMIN — CEFEPIME 2000 MG: 2 INJECTION, POWDER, FOR SOLUTION INTRAVENOUS at 22:18

## 2022-07-12 ASSESSMENT — PAIN - FUNCTIONAL ASSESSMENT: PAIN_FUNCTIONAL_ASSESSMENT: 0-10

## 2022-07-12 ASSESSMENT — PAIN DESCRIPTION - LOCATION: LOCATION: LEG

## 2022-07-12 ASSESSMENT — PAIN SCALES - GENERAL
PAINLEVEL_OUTOF10: 10
PAINLEVEL_OUTOF10: 10

## 2022-07-12 ASSESSMENT — PAIN DESCRIPTION - ORIENTATION: ORIENTATION: RIGHT

## 2022-07-13 VITALS
RESPIRATION RATE: 16 BRPM | HEART RATE: 80 BPM | SYSTOLIC BLOOD PRESSURE: 150 MMHG | TEMPERATURE: 98.6 F | DIASTOLIC BLOOD PRESSURE: 90 MMHG | OXYGEN SATURATION: 96 %

## 2022-07-13 RX ORDER — CEFDINIR 300 MG/1
300 CAPSULE ORAL 2 TIMES DAILY
Qty: 20 CAPSULE | Refills: 0 | Status: SHIPPED | OUTPATIENT
Start: 2022-07-13 | End: 2022-07-23

## 2022-07-13 RX ORDER — DOXYCYCLINE HYCLATE 100 MG
100 TABLET ORAL 2 TIMES DAILY
Qty: 20 TABLET | Refills: 0 | Status: SHIPPED | OUTPATIENT
Start: 2022-07-13 | End: 2022-07-23

## 2022-07-13 ASSESSMENT — PAIN SCALES - GENERAL
PAINLEVEL_OUTOF10: 7
PAINLEVEL_OUTOF10: 8

## 2022-07-13 NOTE — ED PROVIDER NOTES
[address] [facility] [address] COMPLAINT  Leg Injury (Patient had mechanical fall on Thursday 7/7 and has a wound and severe pain in the RLE. Patient states that home nurse prescribed him keflex which he has been taking but states he is concerned for infection, also unable to bear weight.)       HISTORY OF PRESENT ILLNESS  Yaritza Walker is a 79 y.o. male with a past medical history of atrial fibrillation, CHF, CKD and COPD who presents to the ED complaining of right leg pain and swelling. The patient states that he was changing tires in his garage on 7/7 and fell, hitting his right leg on a metal object. He states that the area became more painful, red and swollen and he saw a home health nurse who prescribed Keflex for him. He states that despite taking the Keflex for 3 days, he has had worsening pain and swelling and is now unable to bear weight. He denies any numbness or tingling in the foot. He denies fevers or chills. He is not up-to-date on tetanus however declines tetanus vaccine we discussed the risks of this. No other complaints, modifying factors or associated symptoms. I have reviewed the following from the nursing documentation.     Past Medical History:   Diagnosis Date    Allergic rhinitis     HX of    Atrial fibrillation ([address]     CHF (congestive heart failure) (HCC)     COPD (chronic obstructive pulmonary disease) (HCC)     Dizziness     Elevated PSA     Enlarged prostate     Hearing loss     Hypertension     Hyperthyroidism     resolved    Nosebleed     Polycythemia rubra vera ([address] 12/19/2016    Right leg DVT ([address] 2/2/2015    Tinnitus      Past Surgical History:   Procedure Laterality Date    CARDIAC SURGERY      mitral valve repair    COLONOSCOPY  06/19/2017    COLONOSCOPY  05/22/2018    polyp removal, sigmoid diverticulosis, rectal bleeding    CYSTOSCOPY  1/20/15    Clot Evacuation    CYSTOSCOPY  2/18/15    HERNIA REPAIR      TURP  2/18/15 CLOT EVACUATION, TRANSURETHRAL RESECTION OF THE PROSTATE     History reviewed. No pertinent family history. Social History     Socioeconomic History    Marital status:      Spouse name: Not on file    Number of children: Not on file    Years of education: Not on file    Highest education level: Not on file   Occupational History    Not on file   Tobacco Use    Smoking status: Former Smoker     Years: 30.00     Quit date: 2002     Years since quittin.5    Smokeless tobacco: Never Used    Tobacco comment: 2 pipes per day   Vaping Use    Vaping Use: Never used   Substance and Sexual Activity    Alcohol use: No    Drug use: No    Sexual activity: Yes     Partners: Female   Other Topics Concern    Not on file   Social History Narrative    Not on file     Social Determinants of Health     Financial Resource Strain: Low Risk     Difficulty of Paying Living Expenses: Not hard at all   Food Insecurity: No Food Insecurity    Worried About [address] in the Last Year: Never true    [facility] in the Last Year: Never true   Transportation Needs: No Transportation Needs    Lack of Transportation (Medical): No    Lack of Transportation (Non-Medical):  No   Physical Activity:     Days of Exercise per Week: Not on file    Minutes of Exercise per Session: Not on file   Stress:     Feeling of Stress : Not on file   Social Connections:     Frequency of Communication with Friends and Family: Not on file    Frequency of Social Gatherings with Friends and Family: Not on file    Attends Restorationism Services: Not on file    Active Member of Clubs or Organizations: Not on file    Attends Club or Organization Meetings: Not on file    Marital Status: Not on file   Intimate Partner Violence:     Fear of Current or Ex-Partner: Not on file    Emotionally Abused: Not on file    Physically Abused: Not on file    Sexually Abused: Not on file   Housing Stability: [address] Unable to Pay for Housing in the Last Year: No    Number of Places Lived in the Last Year: 1    Unstable Housing in the Last Year: No     No current facility-administered medications for this encounter. Current Outpatient Medications   Medication Sig Dispense Refill    cefdinir (OMNICEF) 300 MG capsule Take 1 capsule by mouth 2 times daily for 10 days 20 capsule 0    doxycycline hyclate (VIBRA-TABS) 100 MG tablet Take 1 tablet by mouth 2 times daily for 10 days 20 tablet 0    tamsulosin (FLOMAX) 0.4 MG capsule Take 1 capsule by mouth daily 90 capsule 1    gabapentin (NEURONTIN) 300 MG capsule Take 1 capsule by mouth nightly for 30 days.  Intended supply: 30 days 30 capsule 0    furosemide (LASIX) 40 MG tablet Take 1 tablet by mouth 2 times daily for 7 days 30 tablet 0    carvedilol (COREG) 12.5 MG tablet Take 1 tablet by mouth 2 times daily (with meals) 60 tablet 1    fluticasone-umeclidin-vilant (TRELEGY ELLIPTA) 100-62.5-25 MCG/INH AEPB Inhale 1 puff into the lungs daily 1 each 0    Umeclidinium Bromide 62.5 MCG/INH AEPB Inhale 62.5 mcg into the lungs daily 30 each 1    fluticasone-salmeterol (ADVAIR DISKUS) 250-50 MCG/DOSE AEPB Inhale 1 puff into the lungs 2 times daily 1 each 5    aspirin 81 MG tablet Take 81 mg by mouth daily      levalbuterol (XOPENEX) 0.63 MG/3ML nebulization Take 3 mLs by nebulization every 6 hours as needed for Wheezing 125 mL 5    albuterol sulfate HFA (PROVENTIL HFA) 108 (90 Base) MCG/ACT inhaler Inhale 2 puffs into the lungs every 6 hours as needed for Wheezing 1 Inhaler 5    Spacer/Aero-Holding Chambers (E-Z SPACER) MARIFER 1 Device by Does not apply route daily as needed (prn with Albuterol) 1 Device 0    isosorbide mononitrate (IMDUR) 30 MG CR tablet Take 30 mg by mouth daily       Allergies   Allergen Reactions    Pcn [Penicillins] Rash     Skin peeling [facility] Friendly reaction       REVIEW OF SYSTEMS  10 systems reviewed, pertinent positives per HPI otherwise noted to be 0.0 - 0.2 K/uL   Comprehensive Metabolic Panel w/ Reflex to MG   Result Value Ref Range    Sodium 140 136 - 145 mmol/L    Potassium reflex Magnesium 4.7 3.5 - 5.1 mmol/L    Chloride 100 99 - 110 mmol/L    CO2 21 21 - 32 mmol/L    Anion Gap 19 (H) 3 - 16    Glucose 104 (H) 70 - 99 mg/dL    BUN 62 (H) 7 - 20 mg/dL    CREATININE 4.3 (H) 0.8 - 1.3 mg/dL    GFR Non-African American 13 (A) >60    GFR  16 (A) >60    Calcium 9.3 8.3 - 10.6 mg/dL    Total Protein 8.0 6.4 - 8.2 g/dL    Albumin 4.1 3.4 - 5.0 g/dL    Albumin/Globulin Ratio 1.1 1.1 - 2.2    Total Bilirubin 1.9 (H) 0.0 - 1.0 mg/dL    Alkaline Phosphatase 231 (H) 40 - 129 U/L    ALT 6 (L) 10 - 40 U/L    AST 12 (L) 15 - 37 U/L   Lactate, Sepsis   Result Value Ref Range    Lactic Acid, Sepsis 1.1 0.4 - 1.9 mmol/L   CK   Result Value Ref Range    Total CK 48 39 - 308 U/L       ECG    RADIOLOGY  XR TIBIA FIBULA RIGHT (2 VIEWS)    Result Date: 7/12/2022  EXAMINATION: 2 XRAY VIEWS OF THE RIGHT TIBIA AND FIBULA 7/12/2022 9:52 pm COMPARISON: 04/09/2017 HISTORY: ORDERING SYSTEM PROVIDED HISTORY: injury TECHNOLOGIST PROVIDED HISTORY: Reason for exam:->injury Reason for Exam: fell on 7/7 FINDINGS: Mild soft tissue swelling in the lower leg and ankle. No acute osseous abnormality identified. Mild degenerative change in the knee. Mild soft tissue swelling. No acute osseous abnormality identified. No results found. Is this patient to be included in the SEP-1 Core Measure due to severe sepsis or septic shock? No   Exclusion criteria - the patient is NOT to be included for SEP-1 Core Measure due to:  2+ SIRS criteria are not met        ED COURSE/MDM  Patient seen and evaluated. Old records reviewed. Labs and imaging reviewed and results discussed with patient. The patient is an 58-year-old male presenting for evaluation of right leg redness and swelling after an injury. He is hypertensive vital signs otherwise within normal limits.  The right lower extremity is neurovascularly intact. There is evidence of leg abrasions with concerning appearance of cellulitis especially given the fact that he failed treatment with keflex. The patient initially was resistant to bloodwork but ultimately agreeable. He was given vancomycin and cefepime. He does not have a leukocytosis or lactic acidosis. He does not appear to have sepsis. He has CKD no evidence of SHALINI. Xray negative for fracture. Given the appearance of the cellulitis and the fact that the patient cannot bear weight this is concerning and I feel he needs admission for IV antibiotics. However he adamantly refuses to be admitted. I did explain the risks to him regarding not being admitted especially since he failed outpatient therapy already. He exhibits full capacity, understands the risks of leaving and risk to his limb and developing sepsis, and his daughter was here and said that she cannot convince him to stay. The patient will be given omnicef and doxycycline. I did tell him to return to the ED at any point to be admitted and especially told him to return for worsening symptoms and he voices understanding. During the patient's ED course, the patient was given:  Medications   oxyCODONE-acetaminophen (PERCOCET) 5-325 MG per tablet 1 tablet (1 tablet Oral Given 7/12/22 2130)   vancomycin 1000 mg IVPB in 250 mL D5W addavial (0 mg IntraVENous Stopped 7/13/22 0004)   cefepime (MAXIPIME) 2000 mg IVPB minibag (0 mg IntraVENous Stopped 7/12/22 2301)        CLINICAL IMPRESSION  1. Cellulitis of right leg    2. Leg abrasion, infected, right, initial encounter    3. Chronic kidney disease, unspecified CKD stage        Blood pressure (!) 150/90, pulse 80, temperature 98.6 °F (37 °C), temperature source Oral, resp. rate 16, SpO2 96 %. Patient was given scripts for the following medications. I counseled patient how to take these medications.    Discharge Medication List as of 7/13/2022 12:12 AM      START taking these medications    Details   cefdinir (OMNICEF) 300 MG capsule Take 1 capsule by mouth 2 times daily for 10 days, Disp-20 capsule, R-0Normal      doxycycline hyclate (VIBRA-TABS) 100 MG tablet Take 1 tablet by mouth 2 times daily for 10 days, Disp-20 tablet, R-0Normal             Follow-up with:  Lexie Sharma DO  [address] [facility] [phone]    Call in 1 day        DISCLAIMER: This chart was created using Dragon dictation software. Efforts were made by me to ensure accuracy, however some errors may be present due to limitations of this technology and occasionally words are not transcribed correctly.        Henry, [address] [place] [address]  07/13/22 [id number]

## 2022-07-16 LAB
GRAM STAIN RESULT: NORMAL
WOUND/ABSCESS: NORMAL

## 2022-07-18 LAB
BLOOD CULTURE, ROUTINE: NORMAL
CULTURE, BLOOD 2: NORMAL

## 2022-08-01 ENCOUNTER — TELEPHONE (OUTPATIENT)
Dept: FAMILY MEDICINE CLINIC | Age: 82
End: 2022-08-01

## 2022-08-09 ENCOUNTER — TELEPHONE (OUTPATIENT)
Dept: FAMILY MEDICINE CLINIC | Age: 82
End: 2022-08-09

## 2022-08-09 NOTE — TELEPHONE ENCOUNTER
LIONELI: Desiree with [facility] called states daughter told Desiree that the palliative nurse is coming out this week their services are not needed. Desiree states, they will follow up next week.  Thanks

## 2023-01-08 ENCOUNTER — HOSPITAL ENCOUNTER (EMERGENCY)
Age: 83
Discharge: HOME | End: 2023-01-08
Payer: MEDICARE

## 2023-01-08 VITALS
DIASTOLIC BLOOD PRESSURE: 93 MMHG | WEIGHT: 170 LBS | HEIGHT: 70 IN | TEMPERATURE: 98.5 F | HEART RATE: 79 BPM | OXYGEN SATURATION: 93 % | BODY MASS INDEX: 24.34 KG/M2 | SYSTOLIC BLOOD PRESSURE: 157 MMHG | RESPIRATION RATE: 16 BRPM

## 2023-01-08 DIAGNOSIS — J34.89 NASAL MASS: ICD-10-CM

## 2023-01-08 DIAGNOSIS — R04.0 EPISTAXIS: Primary | ICD-10-CM

## 2023-01-08 LAB
A/G RATIO: 1.1 (ref 1.1–2.2)
ALBUMIN SERPL-MCNC: 3.7 G/DL (ref 3.4–5)
ALP BLD-CCNC: 135 U/L (ref 40–129)
ALT SERPL-CCNC: 78 U/L (ref 10–40)
ANION GAP SERPL CALCULATED.3IONS-SCNC: 15 MMOL/L (ref 3–16)
AST SERPL-CCNC: 61 U/L (ref 15–37)
BASOPHILS ABSOLUTE: 0 K/UL (ref 0–0.2)
BASOPHILS RELATIVE PERCENT: 0 %
BILIRUB SERPL-MCNC: 0.9 MG/DL (ref 0–1)
BUN BLDV-MCNC: 80 MG/DL (ref 7–20)
CALCIUM SERPL-MCNC: 8.5 MG/DL (ref 8.3–10.6)
CHLORIDE BLD-SCNC: 97 MMOL/L (ref 99–110)
CO2: 21 MMOL/L (ref 21–32)
CREAT SERPL-MCNC: 4.6 MG/DL (ref 0.8–1.3)
EOSINOPHILS ABSOLUTE: 0.1 K/UL (ref 0–0.6)
EOSINOPHILS RELATIVE PERCENT: 1.3 %
GFR SERPL CREATININE-BSD FRML MDRD: 12 ML/MIN/{1.73_M2}
GLUCOSE BLD-MCNC: 100 MG/DL (ref 70–99)
HCT VFR BLD CALC: 52.2 % (ref 40.5–52.5)
HEMOGLOBIN: 16.5 G/DL (ref 13.5–17.5)
LYMPHOCYTES ABSOLUTE: 0.4 K/UL (ref 1–5.1)
LYMPHOCYTES RELATIVE PERCENT: 4 %
MCH RBC QN AUTO: 26.7 PG (ref 26–34)
MCHC RBC AUTO-ENTMCNC: 31.7 G/DL (ref 31–36)
MCV RBC AUTO: 84.3 FL (ref 80–100)
MONOCYTES ABSOLUTE: 0.5 K/UL (ref 0–1.3)
MONOCYTES RELATIVE PERCENT: 4.8 %
NEUTROPHILS ABSOLUTE: 9 K/UL (ref 1.7–7.7)
NEUTROPHILS RELATIVE PERCENT: 89.9 %
PDW BLD-RTO: 20.8 % (ref 12.4–15.4)
PLATELET # BLD: 212 K/UL (ref 135–450)
PMV BLD AUTO: 7.9 FL (ref 5–10.5)
POTASSIUM SERPL-SCNC: 4.3 MMOL/L (ref 3.5–5.1)
RBC # BLD: 6.18 M/UL (ref 4.2–5.9)
SODIUM BLD-SCNC: 133 MMOL/L (ref 136–145)
SPECIMEN STATUS: NORMAL
TOTAL PROTEIN: 7.1 G/DL (ref 6.4–8.2)
WBC # BLD: 10 K/UL (ref 4–11)

## 2023-01-08 PROCEDURE — 80053 COMPREHEN METABOLIC PANEL: CPT

## 2023-01-08 PROCEDURE — 6370000000 HC RX 637 (ALT 250 FOR IP)

## 2023-01-08 PROCEDURE — 85025 COMPLETE CBC W/AUTO DIFF WBC: CPT

## 2023-01-08 PROCEDURE — 30901 CONTROL OF NOSEBLEED: CPT

## 2023-01-08 PROCEDURE — 99283 EMERGENCY DEPT VISIT LOW MDM: CPT

## 2023-01-08 RX ORDER — NITROGLYCERIN 0.4 MG/1
0.4 TABLET SUBLINGUAL EVERY 5 MIN PRN
Status: DISCONTINUED | OUTPATIENT
Start: 2023-01-08 | End: 2023-01-08

## 2023-01-08 RX ORDER — OXYMETAZOLINE HYDROCHLORIDE 0.05 G/100ML
SPRAY NASAL
Status: COMPLETED
Start: 2023-01-08 | End: 2023-01-08

## 2023-01-08 RX ADMIN — OXYMETAZOLINE HCL: 0.05 SPRAY NASAL at 11:42

## 2023-01-08 ASSESSMENT — PAIN - FUNCTIONAL ASSESSMENT: PAIN_FUNCTIONAL_ASSESSMENT: NONE - DENIES PAIN

## 2023-01-08 NOTE — DISCHARGE INSTRUCTIONS
-Follow up with ENT for nasal mass  -Come back if you feel worse. -See your primary care provider next week for a recheck.

## 2023-01-08 NOTE — ED PROVIDER NOTES
[facility]  ED  EMERGENCY DEPARTMENT ENCOUNTER        Pt Name: Lis Sanders  MRN: [id number]  [place] 1940  Date of evaluation: 1/8/2023  Provider: TYLOR Ireland  PCP: Tomy Shah DO  Note Started: 11:12 AM EST       CRISTINA. I have evaluated this patient. My supervising physician was available for consultation. CHIEF COMPLAINT       Chief Complaint   Patient presents with    Epistaxis     Pt to ED via EMS from home with c/o epistaxis for 3 days. No blood thinners. HISTORY OF PRESENT ILLNESS      Chief Complaint: Epistaxis    Lis Sanders is a 79 y.o. male who presents with nosebleed x3 days. On and off. Been using nosebleed powder, with limited success. No chest pain, fevers, belly pain. No falls. No trauma. History of nosebleed, 3 years ago. SCREENINGS           Is this patient to be included in the SEP-1 Core Measure due to severe sepsis or septic shock? No   Exclusion criteria - the patient is NOT to be included for SEP-1 Core Measure due to: Infection is not suspected      PHYSICAL EXAM     Vitals: BP (!) 160/86   Pulse 79   Temp 98.5 °F (36.9 °C) (Oral)   Resp 16   Ht 5' 10\" (1.778 m)   Wt 170 lb (77.1 kg)   SpO2 96%   BMI 24.39 kg/m²    General: awake, alert, no apparent distress  Pupils: equal, reactive  Head: Non-traumatic. 1 cm bleeding mass right nasal septum. Heart: Rate as noted, regular rhythm, no murmur or rubs. Chest/Lungs: CTAB, no wheezes or crackles  Abdomen: soft, nondistended, no tenderness to palpation   Extremities:  cap refill <2 UE/LE, no tenderness of calves, no edema  Skin: Warm.  No visible rash, lesions, or bruising       DIAGNOSTIC RESULTS   LABS:    Labs Reviewed   CBC WITH AUTO DIFFERENTIAL - Abnormal; Notable for the following components:       Result Value    RBC 6.18 (*)     RDW 20.8 (*)     Neutrophils Absolute 9.0 (*)     Lymphocytes Absolute 0.4 (*)     All other components within normal limits COMPREHENSIVE METABOLIC PANEL - Abnormal; Notable for the following components:    Sodium 133 (*)     Chloride 97 (*)     Glucose 100 (*)     BUN 80 (*)     Creatinine 4.6 (*)     Est, Glom Filt Rate 12 (*)     Alkaline Phosphatase 135 (*)     ALT 78 (*)     AST 61 (*)     All other components within normal limits   SAMPLE POSSIBLE BLOOD BANK TESTING       EKG: When ordered, EKG's are interpreted by the Emergency Department Physician in the absence of a cardiologist.  Please see their note for interpretation of EKG. RADIOLOGY:   No orders to display     No results found. PROCEDURES       CRITICAL CARE TIME   I personally saw the patient and independently provided 0 minutes of non-concurrent critical care time out of the total critical care time provided. This excludes time spent doing separately billable procedures. This includes time at the bedside, data interpretation, medication management, obtaining critical history from collateral sources if the patient is unable to provide it directly, and physician consultation. Specifics of interventions taken and potentially life-threatening diagnostic considerations are listed above in the medical decision making. CONSULTS   None    ED COURSE and MEDICAL DECISIONS MAKING:   Vitals:    Vitals:    01/08/23 1113 01/08/23 1144   BP: (!) 182/100 (!) 160/86   Pulse: 86 79   Resp: 16 16   Temp: 98.5 °F (36.9 °C)    TempSrc: Oral    SpO2: 94% 96%   Weight: 170 lb (77.1 kg)    Height: 5' 10\" (1.778 m)      MEDICATIONS:  Medications   oxymetazoline (AFRIN) 0.05 % nasal spray (  Given 1/8/23 1142)           23-year-old male presents with nosebleed. On exam he has a small 1 cm septal mass, that seems to be the source of the bleed. Bleed stopped with direct pressure, and Afrin. His hemoglobin is stable, and not concerning for acute hemorrhagic anemia. Chemistry is notable for continued chronic kidney disease. Stable. Vitals are unremarkable.   I will refer to ENT for further evaluation of nasal mass. Instructed patient to use Afrin, and direct pressure for rebleeds. Patient will come back if bleeding fails to stop. Discussed plan with patient's son, who will assist with plan as needed. Patient knows to return to the ED should symptoms change or worsen. Follow-up primary care provider next week for a recheck. FINAL IMPRESSION      1. Epistaxis    2.  Nasal mass          DISPOSITION/PLAN   DISPOSITION Decision To Discharge 01/08/2023 12:29:50 PM      PATIENT REFERRED TO:  Chrsitina Dao DO  [address]  [phone]          DISCHARGE MEDICATIONS:  New Prescriptions    No medications on file       Reilly Jeffers (electronically signed)        TYLOR Eldridge  01/08/23 3846

## 2023-01-10 ENCOUNTER — TELEPHONE (OUTPATIENT)
Dept: FAMILY MEDICINE CLINIC | Age: 83
End: 2023-01-10

## 2023-01-10 NOTE — TELEPHONE ENCOUNTER
Patient is due for OV, has not been seen since 2021. When he was last contacted, patient reported not wanting to come into the office.      He is over due for AWV and OV for HTN, CHF, COPD    Please call to schedule, thank you

## 2024-09-12 ENCOUNTER — HOSPITAL ENCOUNTER (EMERGENCY)
Age: 84
Discharge: HOME | End: 2024-09-12
Attending: STUDENT IN AN ORGANIZED HEALTH CARE EDUCATION/TRAINING PROGRAM
Payer: MEDICARE

## 2024-09-12 ENCOUNTER — APPOINTMENT (OUTPATIENT)
Dept: GENERAL RADIOLOGY | Age: 84
End: 2024-09-12
Payer: MEDICARE

## 2024-09-12 VITALS
BODY MASS INDEX: 25.31 KG/M2 | RESPIRATION RATE: 23 BRPM | OXYGEN SATURATION: 100 % | HEART RATE: 86 BPM | HEIGHT: 70 IN | DIASTOLIC BLOOD PRESSURE: 79 MMHG | SYSTOLIC BLOOD PRESSURE: 163 MMHG | WEIGHT: 176.8 LBS | TEMPERATURE: 98.6 F

## 2024-09-12 DIAGNOSIS — I50.9 ACUTE ON CHRONIC CONGESTIVE HEART FAILURE, UNSPECIFIED HEART FAILURE TYPE (HCC): Primary | ICD-10-CM

## 2024-09-12 DIAGNOSIS — N18.5 CHRONIC RENAL FAILURE, STAGE 5 (HCC): ICD-10-CM

## 2024-09-12 DIAGNOSIS — U07.1 COVID: ICD-10-CM

## 2024-09-12 LAB
ALBUMIN SERPL-MCNC: 4 G/DL (ref 3.4–5)
ALBUMIN/GLOB SERPL: 1.3 {RATIO} (ref 1.1–2.2)
ALP SERPL-CCNC: 175 U/L (ref 40–129)
ALT SERPL-CCNC: 13 U/L (ref 10–40)
ANION GAP SERPL CALCULATED.3IONS-SCNC: 17 MMOL/L (ref 3–16)
AST SERPL-CCNC: 13 U/L (ref 15–37)
BACTERIA URNS QL MICRO: ABNORMAL /HPF
BASE EXCESS BLDV CALC-SCNC: -11.5 MMOL/L (ref -3–3)
BASOPHILS # BLD: 0.1 K/UL (ref 0–0.2)
BASOPHILS NFR BLD: 0.8 %
BILIRUB SERPL-MCNC: 1 MG/DL (ref 0–1)
BILIRUB UR QL STRIP.AUTO: NEGATIVE
BUN SERPL-MCNC: 80 MG/DL (ref 7–20)
CALCIUM SERPL-MCNC: 6.7 MG/DL (ref 8.3–10.6)
CHLORIDE SERPL-SCNC: 108 MMOL/L (ref 99–110)
CLARITY UR: ABNORMAL
CO2 BLDV-SCNC: 15 MMOL/L
CO2 SERPL-SCNC: 16 MMOL/L (ref 21–32)
COHGB MFR BLDV: 1.8 % (ref 0–1.5)
COLOR UR: YELLOW
CREAT SERPL-MCNC: 5.8 MG/DL (ref 0.8–1.3)
DEPRECATED RDW RBC AUTO: 18.6 % (ref 12.4–15.4)
EKG DIAGNOSIS: NORMAL
EKG Q-T INTERVAL: 454 MS
EKG QRS DURATION: 168 MS
EKG QTC CALCULATION (BAZETT): 523 MS
EKG R AXIS: 269 DEGREES
EKG T AXIS: 63 DEGREES
EKG VENTRICULAR RATE: 80 BPM
EOSINOPHIL # BLD: 0.1 K/UL (ref 0–0.6)
EOSINOPHIL NFR BLD: 1.1 %
EPI CELLS #/AREA URNS HPF: ABNORMAL /HPF (ref 0–5)
FLUAV RNA RESP QL NAA+PROBE: NOT DETECTED
FLUBV RNA RESP QL NAA+PROBE: NOT DETECTED
GFR SERPLBLD CREATININE-BSD FMLA CKD-EPI: 9 ML/MIN/{1.73_M2}
GLUCOSE SERPL-MCNC: 97 MG/DL (ref 70–99)
GLUCOSE UR STRIP.AUTO-MCNC: NEGATIVE MG/DL
HCO3 BLDV-SCNC: 14 MMOL/L (ref 23–29)
HCT VFR BLD AUTO: 40.2 % (ref 40.5–52.5)
HGB BLD-MCNC: 13 G/DL (ref 13.5–17.5)
HGB UR QL STRIP.AUTO: ABNORMAL
KETONES UR STRIP.AUTO-MCNC: NEGATIVE MG/DL
LEUKOCYTE ESTERASE UR QL STRIP.AUTO: ABNORMAL
LYMPHOCYTES # BLD: 0.5 K/UL (ref 1–5.1)
LYMPHOCYTES NFR BLD: 5.2 %
MCH RBC QN AUTO: 30.3 PG (ref 26–34)
MCHC RBC AUTO-ENTMCNC: 32.3 G/DL (ref 31–36)
MCV RBC AUTO: 93.8 FL (ref 80–100)
METHGB MFR BLDV: 0.3 %
MONOCYTES # BLD: 0.5 K/UL (ref 0–1.3)
MONOCYTES NFR BLD: 5.7 %
NEUTROPHILS # BLD: 8.1 K/UL (ref 1.7–7.7)
NEUTROPHILS NFR BLD: 87.2 %
NITRITE UR QL STRIP.AUTO: POSITIVE
NT-PROBNP SERPL-MCNC: ABNORMAL PG/ML (ref 0–449)
O2 CT VFR BLDV CALC: 18 VOL %
O2 THERAPY: ABNORMAL
PCO2 BLDV: 31.1 MMHG (ref 40–50)
PH BLDV: 7.27 [PH] (ref 7.35–7.45)
PH UR STRIP.AUTO: 6 [PH] (ref 5–8)
PLATELET # BLD AUTO: 142 K/UL (ref 135–450)
PMV BLD AUTO: 9.7 FL (ref 5–10.5)
PO2 BLDV: 89.7 MMHG (ref 25–40)
POTASSIUM SERPL-SCNC: 4.7 MMOL/L (ref 3.5–5.1)
PROT SERPL-MCNC: 7 G/DL (ref 6.4–8.2)
PROT UR STRIP.AUTO-MCNC: 30 MG/DL
RBC # BLD AUTO: 4.28 M/UL (ref 4.2–5.9)
RBC #/AREA URNS HPF: ABNORMAL /HPF (ref 0–4)
SAO2 % BLDV: 96 %
SARS-COV-2 RNA RESP QL NAA+PROBE: DETECTED
SODIUM SERPL-SCNC: 141 MMOL/L (ref 136–145)
SP GR UR STRIP.AUTO: 1.02 (ref 1–1.03)
TROPONIN, HIGH SENSITIVITY: 235 NG/L (ref 0–22)
TROPONIN, HIGH SENSITIVITY: 258 NG/L (ref 0–22)
UA COMPLETE W REFLEX CULTURE PNL UR: YES
UA DIPSTICK W REFLEX MICRO PNL UR: YES
URN SPEC COLLECT METH UR: ABNORMAL
UROBILINOGEN UR STRIP-ACNC: 0.2 E.U./DL
WBC # BLD AUTO: 9.3 K/UL (ref 4–11)
WBC #/AREA URNS HPF: ABNORMAL /HPF (ref 0–5)

## 2024-09-12 PROCEDURE — 82803 BLOOD GASES ANY COMBINATION: CPT

## 2024-09-12 PROCEDURE — 6370000000 HC RX 637 (ALT 250 FOR IP): Performed by: STUDENT IN AN ORGANIZED HEALTH CARE EDUCATION/TRAINING PROGRAM

## 2024-09-12 PROCEDURE — 85025 COMPLETE CBC W/AUTO DIFF WBC: CPT

## 2024-09-12 PROCEDURE — 6360000002 HC RX W HCPCS: Performed by: STUDENT IN AN ORGANIZED HEALTH CARE EDUCATION/TRAINING PROGRAM

## 2024-09-12 PROCEDURE — 83880 ASSAY OF NATRIURETIC PEPTIDE: CPT

## 2024-09-12 PROCEDURE — 87077 CULTURE AEROBIC IDENTIFY: CPT

## 2024-09-12 PROCEDURE — 84484 ASSAY OF TROPONIN QUANT: CPT

## 2024-09-12 PROCEDURE — 93005 ELECTROCARDIOGRAM TRACING: CPT | Performed by: STUDENT IN AN ORGANIZED HEALTH CARE EDUCATION/TRAINING PROGRAM

## 2024-09-12 PROCEDURE — 71045 X-RAY EXAM CHEST 1 VIEW: CPT

## 2024-09-12 PROCEDURE — 2500000003 HC RX 250 WO HCPCS: Performed by: STUDENT IN AN ORGANIZED HEALTH CARE EDUCATION/TRAINING PROGRAM

## 2024-09-12 PROCEDURE — 87186 SC STD MICRODIL/AGAR DIL: CPT

## 2024-09-12 PROCEDURE — 80053 COMPREHEN METABOLIC PANEL: CPT

## 2024-09-12 PROCEDURE — 87636 SARSCOV2 & INF A&B AMP PRB: CPT

## 2024-09-12 PROCEDURE — 93010 ELECTROCARDIOGRAM REPORT: CPT | Performed by: INTERNAL MEDICINE

## 2024-09-12 PROCEDURE — 87086 URINE CULTURE/COLONY COUNT: CPT

## 2024-09-12 PROCEDURE — 99285 EMERGENCY DEPT VISIT HI MDM: CPT

## 2024-09-12 PROCEDURE — 96374 THER/PROPH/DIAG INJ IV PUSH: CPT

## 2024-09-12 PROCEDURE — 81001 URINALYSIS AUTO W/SCOPE: CPT

## 2024-09-12 RX ORDER — CEFUROXIME AXETIL 250 MG/1
250 TABLET ORAL ONCE
Status: COMPLETED | OUTPATIENT
Start: 2024-09-12 | End: 2024-09-12

## 2024-09-12 RX ORDER — CEFUROXIME AXETIL 250 MG/1
250 TABLET ORAL 2 TIMES DAILY
Qty: 14 TABLET | Refills: 0 | Status: ON HOLD | OUTPATIENT
Start: 2024-09-12 | End: 2024-09-19

## 2024-09-12 RX ORDER — GUAIFENESIN 200 MG/10ML
200 LIQUID ORAL EVERY 4 HOURS PRN
Status: DISCONTINUED | OUTPATIENT
Start: 2024-09-12 | End: 2024-09-12 | Stop reason: HOSPADM

## 2024-09-12 RX ORDER — TORSEMIDE 100 MG/1
50 TABLET ORAL ONCE
Status: COMPLETED | OUTPATIENT
Start: 2024-09-12 | End: 2024-09-12

## 2024-09-12 RX ORDER — FUROSEMIDE 10 MG/ML
40 INJECTION INTRAMUSCULAR; INTRAVENOUS ONCE
Status: COMPLETED | OUTPATIENT
Start: 2024-09-12 | End: 2024-09-12

## 2024-09-12 RX ADMIN — FUROSEMIDE 40 MG: 10 INJECTION, SOLUTION INTRAMUSCULAR; INTRAVENOUS at 14:44

## 2024-09-12 RX ADMIN — TORSEMIDE 50 MG: 100 TABLET ORAL at 17:20

## 2024-09-12 RX ADMIN — CEFUROXIME AXETIL 250 MG: 250 TABLET ORAL at 17:20

## 2024-09-12 RX ADMIN — GUAIFENESIN 200 MG: 100 SOLUTION ORAL at 17:28

## 2024-09-12 ASSESSMENT — LIFESTYLE VARIABLES
HOW MANY STANDARD DRINKS CONTAINING ALCOHOL DO YOU HAVE ON A TYPICAL DAY: PATIENT DOES NOT DRINK
HOW OFTEN DO YOU HAVE A DRINK CONTAINING ALCOHOL: NEVER

## 2024-09-14 ENCOUNTER — APPOINTMENT (OUTPATIENT)
Dept: GENERAL RADIOLOGY | Age: 84
DRG: 177 | End: 2024-09-14
Payer: MEDICARE

## 2024-09-14 ENCOUNTER — HOSPITAL ENCOUNTER (INPATIENT)
Age: 84
LOS: 2 days | Discharge: HOSPICE | DRG: 177 | End: 2024-09-17
Attending: EMERGENCY MEDICINE | Admitting: HOSPITALIST
Payer: MEDICARE

## 2024-09-14 DIAGNOSIS — N17.9 ACUTE KIDNEY INJURY SUPERIMPOSED ON CHRONIC KIDNEY DISEASE (HCC): ICD-10-CM

## 2024-09-14 DIAGNOSIS — J96.01 ACUTE RESPIRATORY FAILURE WITH HYPOXIA (HCC): Primary | ICD-10-CM

## 2024-09-14 DIAGNOSIS — N18.9 ACUTE KIDNEY INJURY SUPERIMPOSED ON CHRONIC KIDNEY DISEASE (HCC): ICD-10-CM

## 2024-09-14 DIAGNOSIS — I50.9 ACUTE ON CHRONIC CONGESTIVE HEART FAILURE, UNSPECIFIED HEART FAILURE TYPE (HCC): ICD-10-CM

## 2024-09-14 DIAGNOSIS — R06.02 SHORTNESS OF BREATH: ICD-10-CM

## 2024-09-14 DIAGNOSIS — U07.1 COVID: ICD-10-CM

## 2024-09-14 DIAGNOSIS — E87.20 METABOLIC ACIDOSIS: ICD-10-CM

## 2024-09-14 DIAGNOSIS — J44.9 CHRONIC OBSTRUCTIVE PULMONARY DISEASE, UNSPECIFIED COPD TYPE (HCC): Chronic | ICD-10-CM

## 2024-09-14 LAB
BASE EXCESS BLDV CALC-SCNC: -16.2 MMOL/L (ref -3–3)
BASOPHILS # BLD: 0.1 K/UL (ref 0–0.2)
BASOPHILS NFR BLD: 0.4 %
CO2 BLDV-SCNC: 15 MMOL/L
COHGB MFR BLDV: 2.3 % (ref 0–1.5)
DEPRECATED RDW RBC AUTO: 19.5 % (ref 12.4–15.4)
EOSINOPHIL # BLD: 0.1 K/UL (ref 0–0.6)
EOSINOPHIL NFR BLD: 0.4 %
HCO3 BLDV-SCNC: 13.5 MMOL/L (ref 23–29)
HCT VFR BLD AUTO: 44.1 % (ref 40.5–52.5)
HGB BLD-MCNC: 13.4 G/DL (ref 13.5–17.5)
INR PPP: 1.26 (ref 0.85–1.15)
LACTATE BLDV-SCNC: 1.1 MMOL/L (ref 0.4–2)
LYMPHOCYTES # BLD: 0.3 K/UL (ref 1–5.1)
LYMPHOCYTES NFR BLD: 2 %
MCH RBC QN AUTO: 29.2 PG (ref 26–34)
MCHC RBC AUTO-ENTMCNC: 30.4 G/DL (ref 31–36)
MCV RBC AUTO: 96.1 FL (ref 80–100)
METHGB MFR BLDV: 0.3 %
MONOCYTES # BLD: 0.7 K/UL (ref 0–1.3)
MONOCYTES NFR BLD: 4.1 %
NEUTROPHILS # BLD: 15 K/UL (ref 1.7–7.7)
NEUTROPHILS NFR BLD: 93.1 %
NT-PROBNP SERPL-MCNC: ABNORMAL PG/ML (ref 0–449)
O2 THERAPY: ABNORMAL
PCO2 BLDV: 46.3 MMHG (ref 40–50)
PH BLDV: 7.08 [PH] (ref 7.35–7.45)
PLATELET # BLD AUTO: 170 K/UL (ref 135–450)
PMV BLD AUTO: 9.6 FL (ref 5–10.5)
PO2 BLDV: 59.9 MMHG (ref 25–40)
PROTHROMBIN TIME: 15.9 SEC (ref 11.9–14.9)
RBC # BLD AUTO: 4.59 M/UL (ref 4.2–5.9)
SAO2 % BLDV: 86 %
WBC # BLD AUTO: 16.1 K/UL (ref 4–11)

## 2024-09-14 PROCEDURE — 83605 ASSAY OF LACTIC ACID: CPT

## 2024-09-14 PROCEDURE — 83880 ASSAY OF NATRIURETIC PEPTIDE: CPT

## 2024-09-14 PROCEDURE — 99285 EMERGENCY DEPT VISIT HI MDM: CPT

## 2024-09-14 PROCEDURE — 6370000000 HC RX 637 (ALT 250 FOR IP): Performed by: EMERGENCY MEDICINE

## 2024-09-14 PROCEDURE — 36415 COLL VENOUS BLD VENIPUNCTURE: CPT

## 2024-09-14 PROCEDURE — 94660 CPAP INITIATION&MGMT: CPT

## 2024-09-14 PROCEDURE — 85025 COMPLETE CBC W/AUTO DIFF WBC: CPT

## 2024-09-14 PROCEDURE — 71045 X-RAY EXAM CHEST 1 VIEW: CPT

## 2024-09-14 PROCEDURE — 94761 N-INVAS EAR/PLS OXIMETRY MLT: CPT

## 2024-09-14 PROCEDURE — 85610 PROTHROMBIN TIME: CPT

## 2024-09-14 PROCEDURE — 94640 AIRWAY INHALATION TREATMENT: CPT

## 2024-09-14 PROCEDURE — 5A09457 ASSISTANCE WITH RESPIRATORY VENTILATION, 24-96 CONSECUTIVE HOURS, CONTINUOUS POSITIVE AIRWAY PRESSURE: ICD-10-PCS | Performed by: INTERNAL MEDICINE

## 2024-09-14 PROCEDURE — 83735 ASSAY OF MAGNESIUM: CPT

## 2024-09-14 PROCEDURE — 84484 ASSAY OF TROPONIN QUANT: CPT

## 2024-09-14 PROCEDURE — 82803 BLOOD GASES ANY COMBINATION: CPT

## 2024-09-14 PROCEDURE — 93005 ELECTROCARDIOGRAM TRACING: CPT | Performed by: EMERGENCY MEDICINE

## 2024-09-14 PROCEDURE — 80053 COMPREHEN METABOLIC PANEL: CPT

## 2024-09-14 PROCEDURE — 2700000000 HC OXYGEN THERAPY PER DAY

## 2024-09-14 RX ORDER — IPRATROPIUM BROMIDE AND ALBUTEROL SULFATE 2.5; .5 MG/3ML; MG/3ML
1 SOLUTION RESPIRATORY (INHALATION) ONCE
Status: COMPLETED | OUTPATIENT
Start: 2024-09-14 | End: 2024-09-14

## 2024-09-14 RX ADMIN — NITROGLYCERIN 1 INCH: 20 OINTMENT TOPICAL at 23:19

## 2024-09-14 RX ADMIN — IPRATROPIUM BROMIDE AND ALBUTEROL SULFATE 1 DOSE: .5; 3 SOLUTION RESPIRATORY (INHALATION) at 23:21

## 2024-09-14 ASSESSMENT — PAIN - FUNCTIONAL ASSESSMENT: PAIN_FUNCTIONAL_ASSESSMENT: NONE - DENIES PAIN

## 2024-09-14 ASSESSMENT — PAIN SCALES - GENERAL: PAINLEVEL_OUTOF10: 0

## 2024-09-15 PROBLEM — J96.02 ACUTE RESPIRATORY FAILURE WITH HYPOXIA AND HYPERCAPNIA (HCC): Status: ACTIVE | Noted: 2024-09-15

## 2024-09-15 PROBLEM — I50.23 HEART FAILURE, SYSTOLIC, WITH ACUTE DECOMPENSATION (HCC): Status: ACTIVE | Noted: 2024-09-15

## 2024-09-15 PROBLEM — N25.89 UREMIC ACIDOSIS: Status: ACTIVE | Noted: 2024-09-15

## 2024-09-15 PROBLEM — J44.1 COPD EXACERBATION (HCC): Status: ACTIVE | Noted: 2024-09-15

## 2024-09-15 PROBLEM — N30.00 ACUTE CYSTITIS: Status: ACTIVE | Noted: 2024-09-15

## 2024-09-15 PROBLEM — I50.9 ACUTE DECOMPENSATED HEART FAILURE (HCC): Status: ACTIVE | Noted: 2024-09-15

## 2024-09-15 PROBLEM — J96.01 ACUTE RESPIRATORY FAILURE WITH HYPOXIA AND HYPERCAPNIA (HCC): Status: ACTIVE | Noted: 2024-09-15

## 2024-09-15 PROBLEM — U07.1 COVID-19 VIRUS INFECTION: Status: ACTIVE | Noted: 2024-09-15

## 2024-09-15 PROBLEM — A36.85: Status: ACTIVE | Noted: 2024-09-15

## 2024-09-15 LAB
ALBUMIN SERPL-MCNC: 3.6 G/DL (ref 3.4–5)
ALBUMIN SERPL-MCNC: 3.8 G/DL (ref 3.4–5)
ALBUMIN SERPL-MCNC: 4.5 G/DL (ref 3.4–5)
ALBUMIN/GLOB SERPL: 1.6 {RATIO} (ref 1.1–2.2)
ALP SERPL-CCNC: 134 U/L (ref 40–129)
ALP SERPL-CCNC: 169 U/L (ref 40–129)
ALT SERPL-CCNC: 10 U/L (ref 10–40)
ALT SERPL-CCNC: 14 U/L (ref 10–40)
ANION GAP SERPL CALCULATED.3IONS-SCNC: 17 MMOL/L (ref 3–16)
ANION GAP SERPL CALCULATED.3IONS-SCNC: 19 MMOL/L (ref 3–16)
ANION GAP SERPL CALCULATED.3IONS-SCNC: 20 MMOL/L (ref 3–16)
AST SERPL-CCNC: 10 U/L (ref 15–37)
AST SERPL-CCNC: 9 U/L (ref 15–37)
BACTERIA UR CULT: ABNORMAL
BASE EXCESS BLDV CALC-SCNC: -14.9 MMOL/L (ref -3–3)
BILIRUB DIRECT SERPL-MCNC: 0.4 MG/DL (ref 0–0.3)
BILIRUB INDIRECT SERPL-MCNC: 0.2 MG/DL (ref 0–1)
BILIRUB SERPL-MCNC: 0.6 MG/DL (ref 0–1)
BILIRUB SERPL-MCNC: 0.7 MG/DL (ref 0–1)
BILIRUB UR QL STRIP.AUTO: NEGATIVE
BUN SERPL-MCNC: 102 MG/DL (ref 7–20)
BUN SERPL-MCNC: 105 MG/DL (ref 7–20)
BUN SERPL-MCNC: 108 MG/DL (ref 7–20)
CALCIUM SERPL-MCNC: 6.3 MG/DL (ref 8.3–10.6)
CALCIUM SERPL-MCNC: 6.5 MG/DL (ref 8.3–10.6)
CALCIUM SERPL-MCNC: 7 MG/DL (ref 8.3–10.6)
CHLORIDE SERPL-SCNC: 102 MMOL/L (ref 99–110)
CHLORIDE SERPL-SCNC: 104 MMOL/L (ref 99–110)
CHLORIDE SERPL-SCNC: 105 MMOL/L (ref 99–110)
CLARITY UR: ABNORMAL
CO2 BLDV-SCNC: 17 MMOL/L
CO2 SERPL-SCNC: 15 MMOL/L (ref 21–32)
CO2 SERPL-SCNC: 15 MMOL/L (ref 21–32)
CO2 SERPL-SCNC: 20 MMOL/L (ref 21–32)
COHGB MFR BLDV: 2.1 % (ref 0–1.5)
COLOR UR: ABNORMAL
CREAT SERPL-MCNC: 6.5 MG/DL (ref 0.8–1.3)
CREAT SERPL-MCNC: 6.7 MG/DL (ref 0.8–1.3)
CREAT SERPL-MCNC: 6.8 MG/DL (ref 0.8–1.3)
EKG DIAGNOSIS: NORMAL
EKG Q-T INTERVAL: 446 MS
EKG QRS DURATION: 172 MS
EKG QTC CALCULATION (BAZETT): 518 MS
EKG R AXIS: -85 DEGREES
EKG T AXIS: 75 DEGREES
EKG VENTRICULAR RATE: 81 BPM
GFR SERPLBLD CREATININE-BSD FMLA CKD-EPI: 7 ML/MIN/{1.73_M2}
GFR SERPLBLD CREATININE-BSD FMLA CKD-EPI: 8 ML/MIN/{1.73_M2}
GFR SERPLBLD CREATININE-BSD FMLA CKD-EPI: 8 ML/MIN/{1.73_M2}
GLUCOSE SERPL-MCNC: 155 MG/DL (ref 70–99)
GLUCOSE SERPL-MCNC: 159 MG/DL (ref 70–99)
GLUCOSE SERPL-MCNC: 188 MG/DL (ref 70–99)
GLUCOSE UR STRIP.AUTO-MCNC: NEGATIVE MG/DL
HCO3 BLDV-SCNC: 15.2 MMOL/L (ref 23–29)
HGB UR QL STRIP.AUTO: ABNORMAL
KETONES UR STRIP.AUTO-MCNC: NEGATIVE MG/DL
LEUKOCYTE ESTERASE UR QL STRIP.AUTO: ABNORMAL
MAGNESIUM SERPL-MCNC: 2 MG/DL (ref 1.8–2.4)
MAGNESIUM SERPL-MCNC: 2 MG/DL (ref 1.8–2.4)
METHGB MFR BLDV: 0.4 %
NITRITE UR QL STRIP.AUTO: NEGATIVE
NT-PROBNP SERPL-MCNC: ABNORMAL PG/ML (ref 0–449)
O2 THERAPY: ABNORMAL
ORGANISM: ABNORMAL
PCO2 BLDV: 52.3 MMHG (ref 40–50)
PH BLDV: 7.08 [PH] (ref 7.35–7.45)
PH UR STRIP.AUTO: 5.5 [PH] (ref 5–8)
PHOSPHATE SERPL-MCNC: 7 MG/DL (ref 2.5–4.9)
PHOSPHATE SERPL-MCNC: 7.3 MG/DL (ref 2.5–4.9)
PO2 BLDV: 46.8 MMHG (ref 25–40)
POTASSIUM SERPL-SCNC: 4.7 MMOL/L (ref 3.5–5.1)
POTASSIUM SERPL-SCNC: 4.8 MMOL/L (ref 3.5–5.1)
POTASSIUM SERPL-SCNC: 4.8 MMOL/L (ref 3.5–5.1)
PROT SERPL-MCNC: 6.5 G/DL (ref 6.4–8.2)
PROT SERPL-MCNC: 7.4 G/DL (ref 6.4–8.2)
PROT UR STRIP.AUTO-MCNC: 30 MG/DL
RBC #/AREA URNS HPF: >100 /HPF (ref 0–4)
SAO2 % BLDV: 77 %
SODIUM SERPL-SCNC: 138 MMOL/L (ref 136–145)
SODIUM SERPL-SCNC: 139 MMOL/L (ref 136–145)
SODIUM SERPL-SCNC: 140 MMOL/L (ref 136–145)
SP GR UR STRIP.AUTO: 1.02 (ref 1–1.03)
TROPONIN, HIGH SENSITIVITY: 176 NG/L (ref 0–22)
TROPONIN, HIGH SENSITIVITY: 180 NG/L (ref 0–22)
TROPONIN, HIGH SENSITIVITY: 195 NG/L (ref 0–22)
TROPONIN, HIGH SENSITIVITY: 212 NG/L (ref 0–22)
UA COMPLETE W REFLEX CULTURE PNL UR: ABNORMAL
UA DIPSTICK W REFLEX MICRO PNL UR: YES
URN SPEC COLLECT METH UR: ABNORMAL
UROBILINOGEN UR STRIP-ACNC: 0.2 E.U./DL
WBC #/AREA URNS HPF: ABNORMAL /HPF (ref 0–5)

## 2024-09-15 PROCEDURE — 93010 ELECTROCARDIOGRAM REPORT: CPT | Performed by: INTERNAL MEDICINE

## 2024-09-15 PROCEDURE — 2580000003 HC RX 258: Performed by: HOSPITALIST

## 2024-09-15 PROCEDURE — 2000000000 HC ICU R&B

## 2024-09-15 PROCEDURE — 87081 CULTURE SCREEN ONLY: CPT

## 2024-09-15 PROCEDURE — 99223 1ST HOSP IP/OBS HIGH 75: CPT | Performed by: INTERNAL MEDICINE

## 2024-09-15 PROCEDURE — 51798 US URINE CAPACITY MEASURE: CPT

## 2024-09-15 PROCEDURE — 80069 RENAL FUNCTION PANEL: CPT

## 2024-09-15 PROCEDURE — 6360000002 HC RX W HCPCS: Performed by: HOSPITALIST

## 2024-09-15 PROCEDURE — 81001 URINALYSIS AUTO W/SCOPE: CPT

## 2024-09-15 PROCEDURE — 2580000003 HC RX 258: Performed by: EMERGENCY MEDICINE

## 2024-09-15 PROCEDURE — 84484 ASSAY OF TROPONIN QUANT: CPT

## 2024-09-15 PROCEDURE — 94761 N-INVAS EAR/PLS OXIMETRY MLT: CPT

## 2024-09-15 PROCEDURE — 83880 ASSAY OF NATRIURETIC PEPTIDE: CPT

## 2024-09-15 PROCEDURE — 82803 BLOOD GASES ANY COMBINATION: CPT

## 2024-09-15 PROCEDURE — 96365 THER/PROPH/DIAG IV INF INIT: CPT

## 2024-09-15 PROCEDURE — 96375 TX/PRO/DX INJ NEW DRUG ADDON: CPT

## 2024-09-15 PROCEDURE — 96376 TX/PRO/DX INJ SAME DRUG ADON: CPT

## 2024-09-15 PROCEDURE — 6360000002 HC RX W HCPCS: Performed by: EMERGENCY MEDICINE

## 2024-09-15 PROCEDURE — 2700000000 HC OXYGEN THERAPY PER DAY

## 2024-09-15 PROCEDURE — 2500000003 HC RX 250 WO HCPCS: Performed by: HOSPITALIST

## 2024-09-15 PROCEDURE — 6370000000 HC RX 637 (ALT 250 FOR IP): Performed by: PHYSICIAN ASSISTANT

## 2024-09-15 PROCEDURE — 6370000000 HC RX 637 (ALT 250 FOR IP): Performed by: INTERNAL MEDICINE

## 2024-09-15 PROCEDURE — 80076 HEPATIC FUNCTION PANEL: CPT

## 2024-09-15 PROCEDURE — 36415 COLL VENOUS BLD VENIPUNCTURE: CPT

## 2024-09-15 PROCEDURE — 6370000000 HC RX 637 (ALT 250 FOR IP): Performed by: HOSPITALIST

## 2024-09-15 PROCEDURE — 83735 ASSAY OF MAGNESIUM: CPT

## 2024-09-15 PROCEDURE — 2500000003 HC RX 250 WO HCPCS: Performed by: EMERGENCY MEDICINE

## 2024-09-15 RX ORDER — ASPIRIN 81 MG/1
81 TABLET ORAL DAILY
Status: DISCONTINUED | OUTPATIENT
Start: 2024-09-15 | End: 2024-09-16

## 2024-09-15 RX ORDER — FUROSEMIDE 10 MG/ML
40 INJECTION INTRAMUSCULAR; INTRAVENOUS ONCE
Status: COMPLETED | OUTPATIENT
Start: 2024-09-15 | End: 2024-09-15

## 2024-09-15 RX ORDER — ACETAMINOPHEN 650 MG/1
650 SUPPOSITORY RECTAL EVERY 6 HOURS PRN
Status: DISCONTINUED | OUTPATIENT
Start: 2024-09-15 | End: 2024-09-17 | Stop reason: HOSPADM

## 2024-09-15 RX ORDER — SODIUM CHLORIDE 0.9 % (FLUSH) 0.9 %
5-40 SYRINGE (ML) INJECTION EVERY 12 HOURS SCHEDULED
Status: DISCONTINUED | OUTPATIENT
Start: 2024-09-15 | End: 2024-09-17 | Stop reason: HOSPADM

## 2024-09-15 RX ORDER — IPRATROPIUM BROMIDE AND ALBUTEROL SULFATE 2.5; .5 MG/3ML; MG/3ML
1 SOLUTION RESPIRATORY (INHALATION) EVERY 4 HOURS PRN
Status: DISCONTINUED | OUTPATIENT
Start: 2024-09-15 | End: 2024-09-17 | Stop reason: HOSPADM

## 2024-09-15 RX ORDER — ONDANSETRON 4 MG/1
4 TABLET, ORALLY DISINTEGRATING ORAL EVERY 8 HOURS PRN
Status: DISCONTINUED | OUTPATIENT
Start: 2024-09-15 | End: 2024-09-15

## 2024-09-15 RX ORDER — ISOSORBIDE MONONITRATE 30 MG/1
30 TABLET, EXTENDED RELEASE ORAL DAILY
Status: DISCONTINUED | OUTPATIENT
Start: 2024-09-15 | End: 2024-09-16

## 2024-09-15 RX ORDER — GABAPENTIN 300 MG/1
300 CAPSULE ORAL NIGHTLY
Status: DISCONTINUED | OUTPATIENT
Start: 2024-09-15 | End: 2024-09-17 | Stop reason: HOSPADM

## 2024-09-15 RX ORDER — FUROSEMIDE 10 MG/ML
60 INJECTION INTRAMUSCULAR; INTRAVENOUS 3 TIMES DAILY
Status: DISCONTINUED | OUTPATIENT
Start: 2024-09-15 | End: 2024-09-16

## 2024-09-15 RX ORDER — TAMSULOSIN HYDROCHLORIDE 0.4 MG/1
0.8 CAPSULE ORAL DAILY
Status: DISCONTINUED | OUTPATIENT
Start: 2024-09-16 | End: 2024-09-17 | Stop reason: HOSPADM

## 2024-09-15 RX ORDER — SODIUM CHLORIDE 0.9 % (FLUSH) 0.9 %
5-40 SYRINGE (ML) INJECTION PRN
Status: DISCONTINUED | OUTPATIENT
Start: 2024-09-15 | End: 2024-09-17 | Stop reason: HOSPADM

## 2024-09-15 RX ORDER — POLYETHYLENE GLYCOL 3350 17 G/17G
17 POWDER, FOR SOLUTION ORAL DAILY PRN
Status: DISCONTINUED | OUTPATIENT
Start: 2024-09-15 | End: 2024-09-17 | Stop reason: HOSPADM

## 2024-09-15 RX ORDER — SENNOSIDES A AND B 8.6 MG/1
1 TABLET, FILM COATED ORAL DAILY PRN
Status: DISCONTINUED | OUTPATIENT
Start: 2024-09-15 | End: 2024-09-17 | Stop reason: HOSPADM

## 2024-09-15 RX ORDER — LIDOCAINE HYDROCHLORIDE 20 MG/ML
JELLY TOPICAL ONCE
Status: COMPLETED | OUTPATIENT
Start: 2024-09-15 | End: 2024-09-15

## 2024-09-15 RX ORDER — BUDESONIDE 0.5 MG/2ML
0.5 INHALANT ORAL PRN
Status: DISCONTINUED | OUTPATIENT
Start: 2024-09-15 | End: 2024-09-17 | Stop reason: HOSPADM

## 2024-09-15 RX ORDER — LINEZOLID 2 MG/ML
600 INJECTION, SOLUTION INTRAVENOUS EVERY 12 HOURS
Status: DISCONTINUED | OUTPATIENT
Start: 2024-09-15 | End: 2024-09-16

## 2024-09-15 RX ORDER — TAMSULOSIN HYDROCHLORIDE 0.4 MG/1
0.4 CAPSULE ORAL DAILY
Status: DISCONTINUED | OUTPATIENT
Start: 2024-09-15 | End: 2024-09-15

## 2024-09-15 RX ORDER — MAGNESIUM HYDROXIDE/ALUMINUM HYDROXICE/SIMETHICONE 120; 1200; 1200 MG/30ML; MG/30ML; MG/30ML
30 SUSPENSION ORAL EVERY 6 HOURS PRN
Status: DISCONTINUED | OUTPATIENT
Start: 2024-09-15 | End: 2024-09-17 | Stop reason: HOSPADM

## 2024-09-15 RX ORDER — IPRATROPIUM BROMIDE AND ALBUTEROL SULFATE 2.5; .5 MG/3ML; MG/3ML
1 SOLUTION RESPIRATORY (INHALATION)
Status: DISCONTINUED | OUTPATIENT
Start: 2024-09-15 | End: 2024-09-15

## 2024-09-15 RX ORDER — ONDANSETRON 2 MG/ML
4 INJECTION INTRAMUSCULAR; INTRAVENOUS EVERY 6 HOURS PRN
Status: DISCONTINUED | OUTPATIENT
Start: 2024-09-15 | End: 2024-09-15

## 2024-09-15 RX ORDER — BUDESONIDE 0.5 MG/2ML
0.5 INHALANT ORAL
Status: DISCONTINUED | OUTPATIENT
Start: 2024-09-15 | End: 2024-09-15

## 2024-09-15 RX ORDER — ACETAMINOPHEN 325 MG/1
650 TABLET ORAL EVERY 6 HOURS PRN
Status: DISCONTINUED | OUTPATIENT
Start: 2024-09-15 | End: 2024-09-17 | Stop reason: HOSPADM

## 2024-09-15 RX ORDER — CARVEDILOL 6.25 MG/1
12.5 TABLET ORAL 2 TIMES DAILY WITH MEALS
Status: DISCONTINUED | OUTPATIENT
Start: 2024-09-15 | End: 2024-09-16

## 2024-09-15 RX ORDER — LORAZEPAM 2 MG/ML
0.5 INJECTION INTRAMUSCULAR EVERY 6 HOURS PRN
Status: DISCONTINUED | OUTPATIENT
Start: 2024-09-15 | End: 2024-09-16

## 2024-09-15 RX ORDER — TAMSULOSIN HYDROCHLORIDE 0.4 MG/1
0.4 CAPSULE ORAL ONCE
Status: COMPLETED | OUTPATIENT
Start: 2024-09-15 | End: 2024-09-15

## 2024-09-15 RX ADMIN — LINEZOLID 600 MG: 600 INJECTION, SOLUTION INTRAVENOUS at 15:54

## 2024-09-15 RX ADMIN — TAMSULOSIN HYDROCHLORIDE 0.4 MG: 0.4 CAPSULE ORAL at 10:26

## 2024-09-15 RX ADMIN — TAMSULOSIN HYDROCHLORIDE 0.4 MG: 0.4 CAPSULE ORAL at 17:04

## 2024-09-15 RX ADMIN — SODIUM BICARBONATE 50 MEQ: 84 INJECTION, SOLUTION INTRAVENOUS at 01:02

## 2024-09-15 RX ADMIN — SODIUM BICARBONATE: 84 INJECTION, SOLUTION INTRAVENOUS at 15:53

## 2024-09-15 RX ADMIN — ISOSORBIDE MONONITRATE 30 MG: 30 TABLET, EXTENDED RELEASE ORAL at 10:25

## 2024-09-15 RX ADMIN — FUROSEMIDE 60 MG: 10 INJECTION, SOLUTION INTRAMUSCULAR; INTRAVENOUS at 14:21

## 2024-09-15 RX ADMIN — FUROSEMIDE 60 MG: 10 INJECTION, SOLUTION INTRAMUSCULAR; INTRAVENOUS at 21:17

## 2024-09-15 RX ADMIN — SODIUM CHLORIDE, PRESERVATIVE FREE 10 ML: 5 INJECTION INTRAVENOUS at 08:32

## 2024-09-15 RX ADMIN — LIDOCAINE HYDROCHLORIDE: 20 JELLY TOPICAL at 08:33

## 2024-09-15 RX ADMIN — FUROSEMIDE 60 MG: 10 INJECTION, SOLUTION INTRAMUSCULAR; INTRAVENOUS at 08:30

## 2024-09-15 RX ADMIN — SODIUM BICARBONATE: 84 INJECTION, SOLUTION INTRAVENOUS at 01:14

## 2024-09-15 RX ADMIN — ASPIRIN 81 MG: 81 TABLET, COATED ORAL at 10:25

## 2024-09-15 RX ADMIN — LINEZOLID 600 MG: 600 INJECTION, SOLUTION INTRAVENOUS at 04:46

## 2024-09-15 RX ADMIN — CARVEDILOL 12.5 MG: 6.25 TABLET, FILM COATED ORAL at 10:23

## 2024-09-15 RX ADMIN — FUROSEMIDE 40 MG: 10 INJECTION, SOLUTION INTRAMUSCULAR; INTRAVENOUS at 01:32

## 2024-09-15 RX ADMIN — LORAZEPAM 0.5 MG: 2 INJECTION INTRAMUSCULAR; INTRAVENOUS at 04:40

## 2024-09-15 RX ADMIN — GABAPENTIN 300 MG: 300 CAPSULE ORAL at 21:17

## 2024-09-15 RX ADMIN — FUROSEMIDE 40 MG: 10 INJECTION, SOLUTION INTRAMUSCULAR; INTRAVENOUS at 00:51

## 2024-09-15 ASSESSMENT — PAIN SCALES - GENERAL
PAINLEVEL_OUTOF10: 0

## 2024-09-16 ENCOUNTER — APPOINTMENT (OUTPATIENT)
Age: 84
DRG: 177 | End: 2024-09-16
Attending: INTERNAL MEDICINE
Payer: MEDICARE

## 2024-09-16 LAB
ALBUMIN SERPL-MCNC: 3.8 G/DL (ref 3.4–5)
ALP SERPL-CCNC: 123 U/L (ref 40–129)
ALT SERPL-CCNC: 10 U/L (ref 10–40)
ANION GAP SERPL CALCULATED.3IONS-SCNC: 17 MMOL/L (ref 3–16)
AST SERPL-CCNC: 8 U/L (ref 15–37)
BILIRUB DIRECT SERPL-MCNC: 0.4 MG/DL (ref 0–0.3)
BILIRUB INDIRECT SERPL-MCNC: 0.3 MG/DL (ref 0–1)
BILIRUB SERPL-MCNC: 0.7 MG/DL (ref 0–1)
BUN SERPL-MCNC: 109 MG/DL (ref 7–20)
CALCIUM SERPL-MCNC: 6.2 MG/DL (ref 8.3–10.6)
CHLORIDE SERPL-SCNC: 103 MMOL/L (ref 99–110)
CO2 SERPL-SCNC: 20 MMOL/L (ref 21–32)
CREAT SERPL-MCNC: 6.6 MG/DL (ref 0.8–1.3)
DEPRECATED RDW RBC AUTO: 18 % (ref 12.4–15.4)
GFR SERPLBLD CREATININE-BSD FMLA CKD-EPI: 8 ML/MIN/{1.73_M2}
GLUCOSE SERPL-MCNC: 157 MG/DL (ref 70–99)
HCT VFR BLD AUTO: 39 % (ref 40.5–52.5)
HGB BLD-MCNC: 12.3 G/DL (ref 13.5–17.5)
MAGNESIUM SERPL-MCNC: 1.9 MG/DL (ref 1.8–2.4)
MCH RBC QN AUTO: 29.7 PG (ref 26–34)
MCHC RBC AUTO-ENTMCNC: 31.5 G/DL (ref 31–36)
MCV RBC AUTO: 94.3 FL (ref 80–100)
NT-PROBNP SERPL-MCNC: ABNORMAL PG/ML (ref 0–449)
PHOSPHATE SERPL-MCNC: 6.8 MG/DL (ref 2.5–4.9)
PLATELET # BLD AUTO: 121 K/UL (ref 135–450)
PMV BLD AUTO: 8.5 FL (ref 5–10.5)
POTASSIUM SERPL-SCNC: 4.5 MMOL/L (ref 3.5–5.1)
PROT SERPL-MCNC: 5.9 G/DL (ref 6.4–8.2)
RBC # BLD AUTO: 4.13 M/UL (ref 4.2–5.9)
SODIUM SERPL-SCNC: 140 MMOL/L (ref 136–145)
WBC # BLD AUTO: 9 K/UL (ref 4–11)

## 2024-09-16 PROCEDURE — 6370000000 HC RX 637 (ALT 250 FOR IP): Performed by: HOSPITALIST

## 2024-09-16 PROCEDURE — 6360000002 HC RX W HCPCS: Performed by: HOSPITALIST

## 2024-09-16 PROCEDURE — 2580000003 HC RX 258: Performed by: HOSPITALIST

## 2024-09-16 PROCEDURE — 2700000000 HC OXYGEN THERAPY PER DAY

## 2024-09-16 PROCEDURE — 6370000000 HC RX 637 (ALT 250 FOR IP): Performed by: INTERNAL MEDICINE

## 2024-09-16 PROCEDURE — 80069 RENAL FUNCTION PANEL: CPT

## 2024-09-16 PROCEDURE — 36415 COLL VENOUS BLD VENIPUNCTURE: CPT

## 2024-09-16 PROCEDURE — 2000000000 HC ICU R&B

## 2024-09-16 PROCEDURE — 99233 SBSQ HOSP IP/OBS HIGH 50: CPT | Performed by: INTERNAL MEDICINE

## 2024-09-16 PROCEDURE — 80076 HEPATIC FUNCTION PANEL: CPT

## 2024-09-16 PROCEDURE — 6360000002 HC RX W HCPCS: Performed by: INTERNAL MEDICINE

## 2024-09-16 PROCEDURE — 94761 N-INVAS EAR/PLS OXIMETRY MLT: CPT

## 2024-09-16 PROCEDURE — 83735 ASSAY OF MAGNESIUM: CPT

## 2024-09-16 PROCEDURE — 83880 ASSAY OF NATRIURETIC PEPTIDE: CPT

## 2024-09-16 PROCEDURE — 2500000003 HC RX 250 WO HCPCS: Performed by: HOSPITALIST

## 2024-09-16 PROCEDURE — 85027 COMPLETE CBC AUTOMATED: CPT

## 2024-09-16 PROCEDURE — 94660 CPAP INITIATION&MGMT: CPT

## 2024-09-16 RX ORDER — LORAZEPAM 2 MG/ML
0.5 INJECTION INTRAMUSCULAR EVERY 4 HOURS PRN
Status: DISCONTINUED | OUTPATIENT
Start: 2024-09-16 | End: 2024-09-17 | Stop reason: HOSPADM

## 2024-09-16 RX ORDER — CARVEDILOL 6.25 MG/1
6.25 TABLET ORAL 2 TIMES DAILY WITH MEALS
Status: DISCONTINUED | OUTPATIENT
Start: 2024-09-16 | End: 2024-09-16

## 2024-09-16 RX ORDER — HYDRALAZINE HYDROCHLORIDE 50 MG/1
50 TABLET, FILM COATED ORAL EVERY 8 HOURS SCHEDULED
Status: DISCONTINUED | OUTPATIENT
Start: 2024-09-16 | End: 2024-09-16

## 2024-09-16 RX ORDER — FUROSEMIDE 10 MG/ML
80 INJECTION INTRAMUSCULAR; INTRAVENOUS ONCE
Status: COMPLETED | OUTPATIENT
Start: 2024-09-16 | End: 2024-09-16

## 2024-09-16 RX ORDER — MUPIROCIN 20 MG/G
OINTMENT TOPICAL 2 TIMES DAILY
Status: DISCONTINUED | OUTPATIENT
Start: 2024-09-16 | End: 2024-09-17 | Stop reason: HOSPADM

## 2024-09-16 RX ORDER — MORPHINE SULFATE 20 MG/ML
2.5 SOLUTION ORAL
Status: DISCONTINUED | OUTPATIENT
Start: 2024-09-16 | End: 2024-09-17 | Stop reason: HOSPADM

## 2024-09-16 RX ORDER — MORPHINE SULFATE 20 MG/ML
5 SOLUTION ORAL
Status: DISCONTINUED | OUTPATIENT
Start: 2024-09-16 | End: 2024-09-17 | Stop reason: HOSPADM

## 2024-09-16 RX ADMIN — FUROSEMIDE 80 MG: 10 INJECTION, SOLUTION INTRAMUSCULAR; INTRAVENOUS at 16:15

## 2024-09-16 RX ADMIN — FUROSEMIDE 60 MG: 10 INJECTION, SOLUTION INTRAMUSCULAR; INTRAVENOUS at 08:31

## 2024-09-16 RX ADMIN — SODIUM CHLORIDE, PRESERVATIVE FREE 10 ML: 5 INJECTION INTRAVENOUS at 20:10

## 2024-09-16 RX ADMIN — LINEZOLID 600 MG: 600 INJECTION, SOLUTION INTRAVENOUS at 03:21

## 2024-09-16 RX ADMIN — SODIUM CHLORIDE, PRESERVATIVE FREE 10 ML: 5 INJECTION INTRAVENOUS at 08:44

## 2024-09-16 RX ADMIN — GABAPENTIN 300 MG: 300 CAPSULE ORAL at 20:09

## 2024-09-16 RX ADMIN — Medication 2.5 MG: at 16:25

## 2024-09-16 RX ADMIN — MUPIROCIN: 20 OINTMENT TOPICAL at 08:32

## 2024-09-16 RX ADMIN — SODIUM BICARBONATE: 84 INJECTION, SOLUTION INTRAVENOUS at 08:31

## 2024-09-16 RX ADMIN — MUPIROCIN: 20 OINTMENT TOPICAL at 20:10

## 2024-09-16 RX ADMIN — ISOSORBIDE MONONITRATE 30 MG: 30 TABLET, EXTENDED RELEASE ORAL at 08:32

## 2024-09-16 RX ADMIN — TAMSULOSIN HYDROCHLORIDE 0.8 MG: 0.4 CAPSULE ORAL at 08:32

## 2024-09-16 RX ADMIN — ASPIRIN 81 MG: 81 TABLET, COATED ORAL at 08:32

## 2024-09-16 RX ADMIN — LORAZEPAM 0.5 MG: 2 INJECTION INTRAMUSCULAR; INTRAVENOUS at 16:15

## 2024-09-17 VITALS
BODY MASS INDEX: 25.08 KG/M2 | OXYGEN SATURATION: 96 % | TEMPERATURE: 97.6 F | WEIGHT: 174.82 LBS | SYSTOLIC BLOOD PRESSURE: 146 MMHG | DIASTOLIC BLOOD PRESSURE: 83 MMHG | HEART RATE: 59 BPM | RESPIRATION RATE: 24 BRPM

## 2024-09-17 LAB — MRSA SPEC QL CULT: NORMAL

## 2024-09-17 PROCEDURE — 94761 N-INVAS EAR/PLS OXIMETRY MLT: CPT

## 2024-09-17 PROCEDURE — 2580000003 HC RX 258: Performed by: HOSPITALIST

## 2024-09-17 PROCEDURE — 6360000002 HC RX W HCPCS: Performed by: INTERNAL MEDICINE

## 2024-09-17 PROCEDURE — 2700000000 HC OXYGEN THERAPY PER DAY

## 2024-09-17 PROCEDURE — 6370000000 HC RX 637 (ALT 250 FOR IP): Performed by: INTERNAL MEDICINE

## 2024-09-17 RX ORDER — MORPHINE SULFATE 20 MG/ML
SOLUTION ORAL
Qty: 10 ML | Refills: 0
Start: 2024-09-17 | End: 2024-09-24

## 2024-09-17 RX ORDER — GABAPENTIN 300 MG/1
300 CAPSULE ORAL NIGHTLY
Qty: 10 CAPSULE | Refills: 0
Start: 2024-09-17 | End: 2024-09-27

## 2024-09-17 RX ADMIN — Medication 2.5 MG: at 02:18

## 2024-09-17 RX ADMIN — LORAZEPAM 0.5 MG: 2 INJECTION INTRAMUSCULAR; INTRAVENOUS at 02:19

## 2024-09-17 RX ADMIN — TAMSULOSIN HYDROCHLORIDE 0.8 MG: 0.4 CAPSULE ORAL at 09:47

## 2024-09-17 RX ADMIN — LORAZEPAM 0.5 MG: 2 INJECTION INTRAMUSCULAR; INTRAVENOUS at 13:29

## 2024-09-17 RX ADMIN — Medication 5 MG: at 13:29

## 2024-09-17 RX ADMIN — SODIUM CHLORIDE, PRESERVATIVE FREE 10 ML: 5 INJECTION INTRAVENOUS at 09:57
